# Patient Record
Sex: FEMALE | Race: WHITE | Employment: UNEMPLOYED | URBAN - NONMETROPOLITAN AREA
[De-identification: names, ages, dates, MRNs, and addresses within clinical notes are randomized per-mention and may not be internally consistent; named-entity substitution may affect disease eponyms.]

---

## 2017-01-10 ENCOUNTER — TELEPHONE (OUTPATIENT)
Dept: PRIMARY CARE CLINIC | Age: 67
End: 2017-01-10

## 2017-02-20 ENCOUNTER — OFFICE VISIT (OUTPATIENT)
Dept: CARDIOLOGY | Age: 67
End: 2017-02-20
Payer: MEDICARE

## 2017-02-20 VITALS
DIASTOLIC BLOOD PRESSURE: 60 MMHG | WEIGHT: 215 LBS | HEART RATE: 68 BPM | HEIGHT: 62 IN | BODY MASS INDEX: 39.56 KG/M2 | SYSTOLIC BLOOD PRESSURE: 138 MMHG

## 2017-02-20 DIAGNOSIS — I34.0 MILD MITRAL REGURGITATION: ICD-10-CM

## 2017-02-20 DIAGNOSIS — I25.10 ARTERIOSCLEROTIC HEART DISEASE (ASHD): ICD-10-CM

## 2017-02-20 DIAGNOSIS — I35.0 MODERATE AORTIC STENOSIS: ICD-10-CM

## 2017-02-20 DIAGNOSIS — I07.1 MILD TRICUSPID REGURGITATION: ICD-10-CM

## 2017-02-20 PROCEDURE — 99213 OFFICE O/P EST LOW 20 MIN: CPT | Performed by: INTERNAL MEDICINE

## 2017-02-20 PROCEDURE — G8598 ASA/ANTIPLAT THER USED: HCPCS | Performed by: INTERNAL MEDICINE

## 2017-02-20 PROCEDURE — G8427 DOCREV CUR MEDS BY ELIG CLIN: HCPCS | Performed by: INTERNAL MEDICINE

## 2017-02-20 PROCEDURE — G8399 PT W/DXA RESULTS DOCUMENT: HCPCS | Performed by: INTERNAL MEDICINE

## 2017-02-20 PROCEDURE — G8484 FLU IMMUNIZE NO ADMIN: HCPCS | Performed by: INTERNAL MEDICINE

## 2017-02-20 PROCEDURE — 1090F PRES/ABSN URINE INCON ASSESS: CPT | Performed by: INTERNAL MEDICINE

## 2017-02-20 PROCEDURE — 1123F ACP DISCUSS/DSCN MKR DOCD: CPT | Performed by: INTERNAL MEDICINE

## 2017-02-20 PROCEDURE — 3014F SCREEN MAMMO DOC REV: CPT | Performed by: INTERNAL MEDICINE

## 2017-02-20 PROCEDURE — G8417 CALC BMI ABV UP PARAM F/U: HCPCS | Performed by: INTERNAL MEDICINE

## 2017-02-20 PROCEDURE — 3017F COLORECTAL CA SCREEN DOC REV: CPT | Performed by: INTERNAL MEDICINE

## 2017-02-20 PROCEDURE — 4040F PNEUMOC VAC/ADMIN/RCVD: CPT | Performed by: INTERNAL MEDICINE

## 2017-02-20 PROCEDURE — 1036F TOBACCO NON-USER: CPT | Performed by: INTERNAL MEDICINE

## 2017-04-27 RX ORDER — LOVASTATIN 40 MG/1
TABLET ORAL
Qty: 30 TABLET | Refills: 0 | Status: SHIPPED | OUTPATIENT
Start: 2017-04-27 | End: 2017-05-24 | Stop reason: SDUPTHER

## 2017-05-24 ENCOUNTER — OFFICE VISIT (OUTPATIENT)
Dept: PRIMARY CARE CLINIC | Age: 67
End: 2017-05-24
Payer: MEDICARE

## 2017-05-24 VITALS
SYSTOLIC BLOOD PRESSURE: 120 MMHG | DIASTOLIC BLOOD PRESSURE: 79 MMHG | HEIGHT: 62 IN | HEART RATE: 90 BPM | BODY MASS INDEX: 38.64 KG/M2 | TEMPERATURE: 97.4 F | WEIGHT: 210 LBS | OXYGEN SATURATION: 94 % | RESPIRATION RATE: 18 BRPM

## 2017-05-24 DIAGNOSIS — D50.8 OTHER IRON DEFICIENCY ANEMIA: Primary | ICD-10-CM

## 2017-05-24 DIAGNOSIS — Z91.89 AT HIGH RISK FOR BREAST CANCER: ICD-10-CM

## 2017-05-24 DIAGNOSIS — E11.9 DIABETES MELLITUS WITHOUT COMPLICATION (HCC): ICD-10-CM

## 2017-05-24 DIAGNOSIS — I10 ESSENTIAL HYPERTENSION: ICD-10-CM

## 2017-05-24 DIAGNOSIS — E78.49 OTHER HYPERLIPIDEMIA: ICD-10-CM

## 2017-05-24 DIAGNOSIS — Z12.39 SCREENING FOR BREAST CANCER: ICD-10-CM

## 2017-05-24 PROCEDURE — 3014F SCREEN MAMMO DOC REV: CPT | Performed by: NURSE PRACTITIONER

## 2017-05-24 PROCEDURE — 3017F COLORECTAL CA SCREEN DOC REV: CPT | Performed by: NURSE PRACTITIONER

## 2017-05-24 PROCEDURE — 1036F TOBACCO NON-USER: CPT | Performed by: NURSE PRACTITIONER

## 2017-05-24 PROCEDURE — 3044F HG A1C LEVEL LT 7.0%: CPT | Performed by: NURSE PRACTITIONER

## 2017-05-24 PROCEDURE — G8427 DOCREV CUR MEDS BY ELIG CLIN: HCPCS | Performed by: NURSE PRACTITIONER

## 2017-05-24 PROCEDURE — 4040F PNEUMOC VAC/ADMIN/RCVD: CPT | Performed by: NURSE PRACTITIONER

## 2017-05-24 PROCEDURE — 99214 OFFICE O/P EST MOD 30 MIN: CPT | Performed by: NURSE PRACTITIONER

## 2017-05-24 PROCEDURE — G8598 ASA/ANTIPLAT THER USED: HCPCS | Performed by: NURSE PRACTITIONER

## 2017-05-24 PROCEDURE — 1123F ACP DISCUSS/DSCN MKR DOCD: CPT | Performed by: NURSE PRACTITIONER

## 2017-05-24 PROCEDURE — G8417 CALC BMI ABV UP PARAM F/U: HCPCS | Performed by: NURSE PRACTITIONER

## 2017-05-24 PROCEDURE — G8399 PT W/DXA RESULTS DOCUMENT: HCPCS | Performed by: NURSE PRACTITIONER

## 2017-05-24 PROCEDURE — 1090F PRES/ABSN URINE INCON ASSESS: CPT | Performed by: NURSE PRACTITIONER

## 2017-05-24 RX ORDER — LOVASTATIN 40 MG/1
40 TABLET ORAL NIGHTLY
Qty: 90 TABLET | Refills: 0 | Status: SHIPPED | OUTPATIENT
Start: 2017-05-24 | End: 2017-08-27 | Stop reason: SDUPTHER

## 2017-05-24 ASSESSMENT — ENCOUNTER SYMPTOMS
SHORTNESS OF BREATH: 0
WHEEZING: 0
APNEA: 0

## 2017-05-31 ENCOUNTER — HOSPITAL ENCOUNTER (OUTPATIENT)
Dept: WOMENS IMAGING | Age: 67
Discharge: HOME | End: 2017-05-31
Payer: MEDICARE

## 2017-05-31 DIAGNOSIS — Z91.89 AT HIGH RISK FOR BREAST CANCER: ICD-10-CM

## 2017-05-31 DIAGNOSIS — Z12.39 SCREENING FOR BREAST CANCER: ICD-10-CM

## 2017-05-31 PROCEDURE — 77063 BREAST TOMOSYNTHESIS BI: CPT

## 2017-06-08 DIAGNOSIS — E11.9 DIABETES MELLITUS WITHOUT COMPLICATION (HCC): ICD-10-CM

## 2017-06-08 DIAGNOSIS — E78.49 OTHER HYPERLIPIDEMIA: ICD-10-CM

## 2017-06-08 DIAGNOSIS — I10 ESSENTIAL HYPERTENSION: ICD-10-CM

## 2017-06-08 DIAGNOSIS — D50.8 OTHER IRON DEFICIENCY ANEMIA: ICD-10-CM

## 2017-06-08 LAB
ALBUMIN SERPL-MCNC: 4 G/DL (ref 3.5–5.2)
ALP BLD-CCNC: 78 U/L (ref 35–104)
ALT SERPL-CCNC: 18 U/L (ref 5–33)
ANION GAP SERPL CALCULATED.3IONS-SCNC: 16 MMOL/L (ref 7–19)
AST SERPL-CCNC: 19 U/L (ref 5–32)
BILIRUB SERPL-MCNC: 0.4 MG/DL (ref 0.2–1.2)
BUN BLDV-MCNC: 19 MG/DL (ref 8–23)
CALCIUM SERPL-MCNC: 9.6 MG/DL (ref 8.8–10.2)
CHLORIDE BLD-SCNC: 98 MMOL/L (ref 98–111)
CHOLESTEROL, TOTAL: 171 MG/DL (ref 160–199)
CO2: 27 MMOL/L (ref 22–29)
CREAT SERPL-MCNC: 0.7 MG/DL (ref 0.5–0.9)
GFR NON-AFRICAN AMERICAN: >60
GLUCOSE BLD-MCNC: 88 MG/DL (ref 74–109)
HBA1C MFR BLD: 5.9 %
HCT VFR BLD CALC: 37.7 % (ref 37–47)
HDLC SERPL-MCNC: 32 MG/DL (ref 65–121)
HEMOGLOBIN: 12.7 G/DL (ref 12–16)
LDL CHOLESTEROL CALCULATED: 99 MG/DL
MCH RBC QN AUTO: 28.1 PG (ref 27–31)
MCHC RBC AUTO-ENTMCNC: 33.7 G/DL (ref 33–37)
MCV RBC AUTO: 83.4 FL (ref 81–99)
PDW BLD-RTO: 12.4 % (ref 11.5–14.5)
PLATELET # BLD: 371 K/UL (ref 130–400)
PMV BLD AUTO: 8.7 FL (ref 9.4–12.3)
POTASSIUM SERPL-SCNC: 3.7 MMOL/L (ref 3.5–5)
RBC # BLD: 4.52 M/UL (ref 4.2–5.4)
SODIUM BLD-SCNC: 141 MMOL/L (ref 136–145)
TOTAL PROTEIN: 7.6 G/DL (ref 6.6–8.7)
TRIGL SERPL-MCNC: 202 MG/DL (ref 150–199)
WBC # BLD: 9.8 K/UL (ref 4.8–10.8)

## 2017-08-10 DIAGNOSIS — I10 ESSENTIAL HYPERTENSION: ICD-10-CM

## 2017-08-10 RX ORDER — METFORMIN HYDROCHLORIDE 500 MG/1
TABLET, EXTENDED RELEASE ORAL
Qty: 180 TABLET | Refills: 1 | Status: SHIPPED | OUTPATIENT
Start: 2017-08-10 | End: 2018-05-24 | Stop reason: SDUPTHER

## 2017-09-13 ENCOUNTER — OFFICE VISIT (OUTPATIENT)
Dept: PRIMARY CARE CLINIC | Age: 67
End: 2017-09-13
Payer: MEDICARE

## 2017-09-13 VITALS
TEMPERATURE: 97.3 F | HEART RATE: 84 BPM | BODY MASS INDEX: 37.47 KG/M2 | RESPIRATION RATE: 18 BRPM | SYSTOLIC BLOOD PRESSURE: 136 MMHG | DIASTOLIC BLOOD PRESSURE: 78 MMHG | WEIGHT: 203.6 LBS | OXYGEN SATURATION: 95 % | HEIGHT: 62 IN

## 2017-09-13 DIAGNOSIS — E78.5 HYPERLIPIDEMIA, UNSPECIFIED HYPERLIPIDEMIA TYPE: ICD-10-CM

## 2017-09-13 DIAGNOSIS — I10 WELL-CONTROLLED HYPERTENSION: ICD-10-CM

## 2017-09-13 DIAGNOSIS — M25.561 ACUTE PAIN OF RIGHT KNEE: ICD-10-CM

## 2017-09-13 DIAGNOSIS — Z11.59 NEED FOR HEPATITIS C SCREENING TEST: Primary | ICD-10-CM

## 2017-09-13 DIAGNOSIS — E11.9 DIABETES MELLITUS WITHOUT COMPLICATION (HCC): ICD-10-CM

## 2017-09-13 PROCEDURE — 3046F HEMOGLOBIN A1C LEVEL >9.0%: CPT | Performed by: NURSE PRACTITIONER

## 2017-09-13 PROCEDURE — 3017F COLORECTAL CA SCREEN DOC REV: CPT | Performed by: NURSE PRACTITIONER

## 2017-09-13 PROCEDURE — 1123F ACP DISCUSS/DSCN MKR DOCD: CPT | Performed by: NURSE PRACTITIONER

## 2017-09-13 PROCEDURE — G8598 ASA/ANTIPLAT THER USED: HCPCS | Performed by: NURSE PRACTITIONER

## 2017-09-13 PROCEDURE — 1036F TOBACCO NON-USER: CPT | Performed by: NURSE PRACTITIONER

## 2017-09-13 PROCEDURE — G8399 PT W/DXA RESULTS DOCUMENT: HCPCS | Performed by: NURSE PRACTITIONER

## 2017-09-13 PROCEDURE — G8417 CALC BMI ABV UP PARAM F/U: HCPCS | Performed by: NURSE PRACTITIONER

## 2017-09-13 PROCEDURE — 99214 OFFICE O/P EST MOD 30 MIN: CPT | Performed by: NURSE PRACTITIONER

## 2017-09-13 PROCEDURE — 4040F PNEUMOC VAC/ADMIN/RCVD: CPT | Performed by: NURSE PRACTITIONER

## 2017-09-13 PROCEDURE — G8427 DOCREV CUR MEDS BY ELIG CLIN: HCPCS | Performed by: NURSE PRACTITIONER

## 2017-09-13 PROCEDURE — 3014F SCREEN MAMMO DOC REV: CPT | Performed by: NURSE PRACTITIONER

## 2017-09-13 PROCEDURE — 1090F PRES/ABSN URINE INCON ASSESS: CPT | Performed by: NURSE PRACTITIONER

## 2017-09-13 RX ORDER — LISINOPRIL AND HYDROCHLOROTHIAZIDE 25; 20 MG/1; MG/1
TABLET ORAL
Qty: 90 TABLET | Refills: 5 | Status: SHIPPED | OUTPATIENT
Start: 2017-09-13 | End: 2018-10-01 | Stop reason: SDUPTHER

## 2017-09-13 RX ORDER — LANCETS 30 GAUGE
1 EACH MISCELLANEOUS DAILY
Qty: 100 EACH | Refills: 5 | Status: SHIPPED | OUTPATIENT
Start: 2017-09-13 | End: 2018-11-26 | Stop reason: SDUPTHER

## 2017-09-13 ASSESSMENT — ENCOUNTER SYMPTOMS
ABDOMINAL PAIN: 0
FACIAL SWELLING: 0
APNEA: 0
EYE PAIN: 0
COLOR CHANGE: 0
DIARRHEA: 0
SHORTNESS OF BREATH: 0
CONSTIPATION: 0
SORE THROAT: 0
COUGH: 0
EYE DISCHARGE: 0
TROUBLE SWALLOWING: 0
EYES NEGATIVE: 1
EYE REDNESS: 0
RHINORRHEA: 0
VOMITING: 0
PHOTOPHOBIA: 0
BACK PAIN: 0
SINUS PRESSURE: 0
CHEST TIGHTNESS: 0
WHEEZING: 0
EYE ITCHING: 0
VOICE CHANGE: 0
NAUSEA: 0
ABDOMINAL DISTENTION: 0

## 2017-09-20 ENCOUNTER — OFFICE VISIT (OUTPATIENT)
Dept: CARDIOLOGY | Age: 67
End: 2017-09-20
Payer: MEDICARE

## 2017-09-20 VITALS
WEIGHT: 206 LBS | DIASTOLIC BLOOD PRESSURE: 70 MMHG | BODY MASS INDEX: 37.91 KG/M2 | SYSTOLIC BLOOD PRESSURE: 118 MMHG | HEART RATE: 76 BPM | HEIGHT: 62 IN

## 2017-09-20 DIAGNOSIS — Z95.2 S/P AVR: ICD-10-CM

## 2017-09-20 DIAGNOSIS — E78.2 MIXED HYPERLIPIDEMIA: ICD-10-CM

## 2017-09-20 DIAGNOSIS — I07.1 MILD TRICUSPID REGURGITATION: ICD-10-CM

## 2017-09-20 DIAGNOSIS — I10 ESSENTIAL HYPERTENSION: ICD-10-CM

## 2017-09-20 DIAGNOSIS — I35.0 MODERATE AORTIC STENOSIS: ICD-10-CM

## 2017-09-20 DIAGNOSIS — I34.0 MILD MITRAL REGURGITATION: ICD-10-CM

## 2017-09-20 DIAGNOSIS — I25.10 CORONARY ARTERY DISEASE INVOLVING NATIVE CORONARY ARTERY OF NATIVE HEART WITHOUT ANGINA PECTORIS: Primary | ICD-10-CM

## 2017-09-20 PROCEDURE — 1123F ACP DISCUSS/DSCN MKR DOCD: CPT | Performed by: NURSE PRACTITIONER

## 2017-09-20 PROCEDURE — G8417 CALC BMI ABV UP PARAM F/U: HCPCS | Performed by: NURSE PRACTITIONER

## 2017-09-20 PROCEDURE — 3017F COLORECTAL CA SCREEN DOC REV: CPT | Performed by: NURSE PRACTITIONER

## 2017-09-20 PROCEDURE — 1090F PRES/ABSN URINE INCON ASSESS: CPT | Performed by: NURSE PRACTITIONER

## 2017-09-20 PROCEDURE — 3014F SCREEN MAMMO DOC REV: CPT | Performed by: NURSE PRACTITIONER

## 2017-09-20 PROCEDURE — G8427 DOCREV CUR MEDS BY ELIG CLIN: HCPCS | Performed by: NURSE PRACTITIONER

## 2017-09-20 PROCEDURE — G8399 PT W/DXA RESULTS DOCUMENT: HCPCS | Performed by: NURSE PRACTITIONER

## 2017-09-20 PROCEDURE — G8598 ASA/ANTIPLAT THER USED: HCPCS | Performed by: NURSE PRACTITIONER

## 2017-09-20 PROCEDURE — 99213 OFFICE O/P EST LOW 20 MIN: CPT | Performed by: NURSE PRACTITIONER

## 2017-09-20 PROCEDURE — 4040F PNEUMOC VAC/ADMIN/RCVD: CPT | Performed by: NURSE PRACTITIONER

## 2017-09-20 PROCEDURE — 1036F TOBACCO NON-USER: CPT | Performed by: NURSE PRACTITIONER

## 2017-09-20 RX ORDER — M-VIT,TX,IRON,MINS/CALC/FOLIC 27MG-0.4MG
1 TABLET ORAL DAILY
COMMUNITY

## 2017-09-27 ENCOUNTER — HOSPITAL ENCOUNTER (OUTPATIENT)
Dept: NON INVASIVE DIAGNOSTICS | Age: 67
Discharge: HOME | End: 2017-09-27
Payer: MEDICARE

## 2017-09-27 DIAGNOSIS — Z95.2 S/P AVR: ICD-10-CM

## 2017-09-27 LAB
LV EF: 60 %
LVEF MODALITY: NORMAL

## 2017-09-27 PROCEDURE — 93306 TTE W/DOPPLER COMPLETE: CPT

## 2018-01-15 ENCOUNTER — TELEPHONE (OUTPATIENT)
Dept: PRIMARY CARE CLINIC | Age: 68
End: 2018-01-15

## 2018-01-15 DIAGNOSIS — E78.5 HYPERLIPIDEMIA, UNSPECIFIED HYPERLIPIDEMIA TYPE: ICD-10-CM

## 2018-01-15 DIAGNOSIS — E11.9 DIABETES MELLITUS WITHOUT COMPLICATION (HCC): ICD-10-CM

## 2018-01-15 DIAGNOSIS — Z11.59 NEED FOR HEPATITIS C SCREENING TEST: ICD-10-CM

## 2018-01-15 LAB
ALBUMIN SERPL-MCNC: 4.5 G/DL (ref 3.5–5.2)
ALP BLD-CCNC: 89 U/L (ref 35–104)
ALT SERPL-CCNC: 21 U/L (ref 5–33)
ANION GAP SERPL CALCULATED.3IONS-SCNC: 13 MMOL/L (ref 7–19)
AST SERPL-CCNC: 27 U/L (ref 5–32)
BILIRUB SERPL-MCNC: 0.5 MG/DL (ref 0.2–1.2)
BUN BLDV-MCNC: 15 MG/DL (ref 8–23)
CALCIUM SERPL-MCNC: 9.9 MG/DL (ref 8.8–10.2)
CHLORIDE BLD-SCNC: 99 MMOL/L (ref 98–111)
CHOLESTEROL, TOTAL: 205 MG/DL (ref 160–199)
CO2: 30 MMOL/L (ref 22–29)
CREAT SERPL-MCNC: 0.8 MG/DL (ref 0.5–0.9)
GFR NON-AFRICAN AMERICAN: >60
GLUCOSE BLD-MCNC: 84 MG/DL (ref 74–109)
HBA1C MFR BLD: 5.7 %
HCT VFR BLD CALC: 38.9 % (ref 37–47)
HDLC SERPL-MCNC: 39 MG/DL (ref 65–121)
HEMOGLOBIN: 13 G/DL (ref 12–16)
HEPATITIS C ANTIBODY INTERPRETATION: NORMAL
LDL CHOLESTEROL CALCULATED: 116 MG/DL
MCH RBC QN AUTO: 28.4 PG (ref 27–31)
MCHC RBC AUTO-ENTMCNC: 33.4 G/DL (ref 33–37)
MCV RBC AUTO: 84.9 FL (ref 81–99)
PDW BLD-RTO: 13.7 % (ref 11.5–14.5)
PLATELET # BLD: 306 K/UL (ref 130–400)
PMV BLD AUTO: 9.4 FL (ref 9.4–12.3)
POTASSIUM SERPL-SCNC: 3.7 MMOL/L (ref 3.5–5)
RBC # BLD: 4.58 M/UL (ref 4.2–5.4)
SODIUM BLD-SCNC: 142 MMOL/L (ref 136–145)
TOTAL PROTEIN: 7.8 G/DL (ref 6.6–8.7)
TRIGL SERPL-MCNC: 250 MG/DL (ref 0–149)
WBC # BLD: 7.2 K/UL (ref 4.8–10.8)

## 2018-01-15 NOTE — TELEPHONE ENCOUNTER
----- Message from LULA Orellana sent at 1/15/2018 12:12 PM CST -----    Please notify patient of abnormal results. Total cholesterol, triglycerides are slightly elevated, HDL is 39 which is lower than we would like to see it. A1c is 5.7 which is good. I would recommend switching from lovastatin to atorvastatin if the patient is willing it would likely be more effective given her abnormalities. She does have an upcoming appointment scheduled later this month. Lipid Panel   Order: [id number]   Status:  Final result   Visible to patient:  No (Not Released) Dx:  Hyperlipidemia, unspecified hyperlipi. .. Notes Recorded by LULA Orellana on 1/15/2018 at 12:12 PM CST  Please notify patient of abnormal results. Total cholesterol, triglycerides are slightly elevated, HDL is 39 which is lower than we would like to see it. A1c is 5.7 which is good. I would recommend switching from lovastatin to atorvastatin if the patient is willing it would likely be more effective given her abnormalities. She does have an upcoming appointment scheduled later this month.      Ref Range & Units 09:59  (1/15/18) 7mo ago  (6/8/17) 1yr ago  (12/30/16) 2yr ago  (8/10/15) 2yr ago  (3/18/15) 3yr ago  (12/11/14)    Cholesterol, Total 160 - 199 mg/dL 205   171CM  200CM   199R, CM  208R, CM   199R, CM    Comments: <160 MG/DL=OPTIMAL     160-199 MG/DL= DESIRABLE     200-239 MG/DL=BORDERLINE-INCREASED RISK OF ATHEROSCLEROTIC   CARDIOVASCULAR DISEASE     > OR = 240 MG/DL-ASSOCIATED WITH AN INCREASED RISK OF   ATHROSCLEROTIC CARDIOVASCULAR DISEASE     Triglycerides 0 - 149 mg/dL 250   202R   241R   408R, CM   346R   328R      HDL 65 - 121 mg/dL 39   32CM   37CM   29R, CM   31R, CM   29R, CM    Comments: VALUES>60 MG/DL ARE ASSOCIATED WITH A DECREASED RISK OF   ATHEROSCLEROTIC CARDIOVASCULAR DISEASE     LDL Calculated <100 mg/dL 116  <100 mg/dL\" class=\"[id number]\">  99CM  <100 mg/dL\" class=\"[id number]\">  115CM       Comments: <100 would recommend switching from lovastatin to atorvastatin if the patient is willing it would likely be more effective given her abnormalities. She does have an upcoming appointment scheduled later this month. Ref Range & Units 09:59  (1/15/18) 7mo ago  (6/8/17) 1yr ago  (12/30/16) 1yr ago  (8/1/16)    Hemoglobin A1C See comment % 5.7  5.9CM  5.8CM  6. 2CM     Comments: HbA1c levels >6% are an indication of hyperglycemia during the preceding 2   to 3 months or longer. HbA1c levels may reach 20% or higher in poorly controlled diabetes. Therapeutic action is suggested at levels above 8%. Diabetes patients with HbA1c levels below 7% meet the goal of the American   Diabetes Association. HbA1c levels below the established reference range may indicate recent   episodes of hypoglycemia, the presence of Hb variants, or shortened lifetime   of erythrocytes.     [facility]  [address] [facility]      Specimen Collected: 01/15/18 09:59 Last Resulted: 01/15/18 11:32              CM=Additional comments              CBC   Order: [id number]   Status:  Final result   Visible to patient:  No (Not Released) Dx:  Diabetes mellitus without complicatio. .. Notes Recorded by LULA Ortega on 1/15/2018 at 12:12 PM CST  Please notify patient of abnormal results. Total cholesterol, triglycerides are slightly elevated, HDL is 39 which is lower than we would like to see it. A1c is 5.7 which is good. I would recommend switching from lovastatin to atorvastatin if the patient is willing it would likely be more effective given her abnormalities. She does have an upcoming appointment scheduled later this month.      Ref Range & Units 09:59  (1/15/18) 7mo ago  (6/8/17) 1yr ago  (12/30/16) 1yr ago  (4/6/16) 1yr ago  (4/5/16) 1yr ago  (4/4/16) 1yr ago  (4/3/16)    WBC 4.8 - 10.8 K/uL 7.2  9.8  6.9  14.0   13.8   13.5   17.1      RBC 4.20 -

## 2018-01-23 ENCOUNTER — TELEPHONE (OUTPATIENT)
Dept: PRIMARY CARE CLINIC | Age: 68
End: 2018-01-23

## 2018-01-24 ENCOUNTER — OFFICE VISIT (OUTPATIENT)
Dept: PRIMARY CARE CLINIC | Age: 68
End: 2018-01-24
Payer: MEDICARE

## 2018-01-24 VITALS
OXYGEN SATURATION: 98 % | DIASTOLIC BLOOD PRESSURE: 87 MMHG | HEART RATE: 81 BPM | TEMPERATURE: 97.6 F | BODY MASS INDEX: 36.62 KG/M2 | SYSTOLIC BLOOD PRESSURE: 138 MMHG | RESPIRATION RATE: 18 BRPM | HEIGHT: 62 IN | WEIGHT: 199 LBS

## 2018-01-24 DIAGNOSIS — E66.9 OBESITY (BMI 35.0-39.9 WITHOUT COMORBIDITY): Chronic | ICD-10-CM

## 2018-01-24 DIAGNOSIS — I10 ESSENTIAL HYPERTENSION: Chronic | ICD-10-CM

## 2018-01-24 DIAGNOSIS — E11.9 TYPE 2 DIABETES MELLITUS WITHOUT COMPLICATION, WITHOUT LONG-TERM CURRENT USE OF INSULIN (HCC): Chronic | ICD-10-CM

## 2018-01-24 DIAGNOSIS — Z23 NEEDS FLU SHOT: ICD-10-CM

## 2018-01-24 DIAGNOSIS — Z23 NEED FOR PROPHYLACTIC VACCINATION AGAINST DIPHTHERIA-TETANUS-PERTUSSIS (DTP): ICD-10-CM

## 2018-01-24 DIAGNOSIS — E78.2 MIXED HYPERLIPIDEMIA: Chronic | ICD-10-CM

## 2018-01-24 PROCEDURE — 3017F COLORECTAL CA SCREEN DOC REV: CPT | Performed by: NURSE PRACTITIONER

## 2018-01-24 PROCEDURE — G8399 PT W/DXA RESULTS DOCUMENT: HCPCS | Performed by: NURSE PRACTITIONER

## 2018-01-24 PROCEDURE — 4040F PNEUMOC VAC/ADMIN/RCVD: CPT | Performed by: NURSE PRACTITIONER

## 2018-01-24 PROCEDURE — 1090F PRES/ABSN URINE INCON ASSESS: CPT | Performed by: NURSE PRACTITIONER

## 2018-01-24 PROCEDURE — 1123F ACP DISCUSS/DSCN MKR DOCD: CPT | Performed by: NURSE PRACTITIONER

## 2018-01-24 PROCEDURE — G0008 ADMIN INFLUENZA VIRUS VAC: HCPCS | Performed by: NURSE PRACTITIONER

## 2018-01-24 PROCEDURE — 3014F SCREEN MAMMO DOC REV: CPT | Performed by: NURSE PRACTITIONER

## 2018-01-24 PROCEDURE — G8484 FLU IMMUNIZE NO ADMIN: HCPCS | Performed by: NURSE PRACTITIONER

## 2018-01-24 PROCEDURE — 1036F TOBACCO NON-USER: CPT | Performed by: NURSE PRACTITIONER

## 2018-01-24 PROCEDURE — 99214 OFFICE O/P EST MOD 30 MIN: CPT | Performed by: NURSE PRACTITIONER

## 2018-01-24 PROCEDURE — G8417 CALC BMI ABV UP PARAM F/U: HCPCS | Performed by: NURSE PRACTITIONER

## 2018-01-24 PROCEDURE — G8427 DOCREV CUR MEDS BY ELIG CLIN: HCPCS | Performed by: NURSE PRACTITIONER

## 2018-01-24 PROCEDURE — 3044F HG A1C LEVEL LT 7.0%: CPT | Performed by: NURSE PRACTITIONER

## 2018-01-24 PROCEDURE — 90662 IIV NO PRSV INCREASED AG IM: CPT | Performed by: NURSE PRACTITIONER

## 2018-01-24 PROCEDURE — G8598 ASA/ANTIPLAT THER USED: HCPCS | Performed by: NURSE PRACTITIONER

## 2018-01-24 ASSESSMENT — ENCOUNTER SYMPTOMS
GASTROINTESTINAL NEGATIVE: 1
RESPIRATORY NEGATIVE: 1
EYES NEGATIVE: 1
ALLERGIC/IMMUNOLOGIC NEGATIVE: 1

## 2018-01-24 NOTE — PROGRESS NOTES
[facility]  [address]  [address] [facility] [address]  Dept: [phone]  Dept Fax: [phone]  Loc: [phone]    Arianna Reis is a 75 y.o. female who presents today for her medical conditions/complaints as noted below. Arianna Reis is c/o of   Chief Complaint   Patient presents with    3 Month Follow-Up    Diabetes    Hypertension    Hyperlipidemia    Other     Obesity with body mass index of 36.40, due for tetanus and influenza symptoms. HPI   This is a pleasant cooperative 60-year-old female who presents in office follow-up on chronic medical conditions. She has a history of type II diabetes which is well controlled, hypertension which is well controlled but is still a little higher than I would like to see it. Cholesterol levels are improving and the patient is actively losing weight and is putting strong emphasis on diet and exercise. She is due for her tetanus and influenza immunizations and is receptive to having that was administered here today. Patient is followed by [address] [place] [address] cardiology she has a history of aortic valve replacement, currently denies chest pain or shortness of breath. Body mass index is 36.4 kg/m². Wt Readings from Last 3 Encounters:   01/24/18 199 lb (90.3 kg)   09/20/17 206 lb (93.4 kg)   09/13/17 203 lb 9.6 oz (92.4 kg)     BP Readings from Last 3 Encounters:   01/24/18 138/87   09/20/17 118/70   09/13/17 136/78      Treatment Adherence:   Medication compliance:  compliant all of the time  Diet compliance:  compliant all of the time  Weight trend: decreasing  Current exercise: walks 5 time(s) per week  Barriers: none    Diabetes Mellitus Type 2: Current symptoms/problems include none. Home blood sugar records: fasting range: 90  Any episodes of hypoglycemia? no  Eye exam current (within one year): yes  Tobacco history: She  reports that she has never smoked.  She has never used smokeless tobacco.   Daily Aspirin? Yes    Hypertension:  Home blood pressure monitoring: Yes -Systolic range has been in the 953I and diastolic in the 27D . She is adherent to a low sodium diet. Patient denies chest pain, shortness of breath, headache, lightheadedness, blurred vision, peripheral edema, palpitations, dry cough and fatigue. Antihypertensive medication side effects: no medication side effects noted. Use of agents associated with hypertension: none. Hyperlipidemia:  No new myalgias or GI upset on lovastatin (Mevacor).        Lab Results   Component Value Date    LABA1C 5.7 01/15/2018    LABA1C 5.9 2017    LABA1C 5.8 2016     Lab Results   Component Value Date    LABMICR <1.20 2016    CREATININE 0.8 01/15/2018     Lab Results   Component Value Date    ALT 21 01/15/2018    AST 27 01/15/2018     Lab Results   Component Value Date    CHOL 205 (H) 01/15/2018    TRIG 250 (H) 01/15/2018    HDL 39 (L) 01/15/2018    LDLCALC 116 01/15/2018    LDLDIRECT 111 08/10/2015        Hemoglobin A1C (%)   Date Value   01/15/2018 5.7   2017 5.9   2016 5.8             ( goal A1C is < 7)   Microalbumin, Random Urine (mg/dL)   Date Value   2016 <1.20     LDL Calculated (mg/dL)   Date Value   01/15/2018 116   2017 99   2016 115       (goal LDL is <100)   AST (U/L)   Date Value   01/15/2018 27     ALT (U/L)   Date Value   01/15/2018 21     BUN (mg/dL)   Date Value   01/15/2018 15     BP Readings from Last 3 Encounters:   18 138/87   17 118/70   17 136/78          (goal 120/80)    Past Medical History:   Diagnosis Date    Arteriosclerotic heart disease (ASHD)     Coronary artery disease involving native coronary artery of native heart without angina pectoris     Family history of heart disease     father  37 MI, brother 46  MI, brother 61  MI, sister 58  MI    Hyperlipidemia     Hypertension     Mild mitral regurgitation 2016    Mild Abdominal: Soft. Bowel sounds are normal.   Musculoskeletal: Normal range of motion. Neurological: She is alert and oriented to person, place, and time. Skin: Skin is warm and dry. Psychiatric: She has a normal mood and affect. Her behavior is normal. Judgment and thought content normal.   Nursing note and vitals reviewed. /87   Pulse 81   Temp 97.6 °F (36.4 °C) (Temporal)   Resp 18   Ht 5' 2\" (1.575 m)   Wt 199 lb (90.3 kg)   SpO2 98%   BMI 36.40 kg/m²     Assessment:      1. Type 2 diabetes mellitus without complication, without long-term current use of insulin ([address]     2. Essential hypertension     3. Mixed hyperlipidemia     4. Obesity (BMI 35.0-39.9 without comorbidity)     5. Needs flu shot  Influenza, High Dose, 65 yrs  and older, IM, PF, Prefill Syr, 0.5mL (FLUZONE HD)   6. Need for prophylactic vaccination against diphtheria-tetanus-pertussis (DTP)  Tdap (age 10y-63y) IM (ADACEL)    DISCONTINUED: Tdap (ADACEL) 5-2-15.5 LF-MCG/0.5 injection             Plan:         Andrew Whitaker was seen today for 3 month follow-up, diabetes, hypertension, hyperlipidemia and other. Diagnoses and all orders for this visit:    Type 2 diabetes mellitus without complication, without long-term current use of insulin ([facility])  Comments:  A1c is 5.7, fasting glucose running in the 90s, patient is exercising and losing weight, she is doing well on her diabetes management    Essential hypertension  Comments:  Patient's blood pressure is ranging in the 124U systolic and 56M diastolic. We discussed interventions to improve, would like to have 120/80 range . Mixed hyperlipidemia  Comments:  Patient's total cholesterol and LDL are still out of range on 40 of Mevacor. We will change her to Lipitor 40 mg which I believe will improve these numbers.     Obesity (BMI 35.0-39.9 without comorbidity)  Comments:  Patient has gradually been losing weight and I'm encouraging her to continue this progress she is no longer in the morbid obesity range. Needs flu shot  -     Influenza, High Dose, 65 yrs  and older, IM, PF, Prefill Syr, 0.5mL (FLUZONE HD)    Need for prophylactic vaccination against diphtheria-tetanus-pertussis (DTP)  -     Discontinue: Tdap (ADACEL) 5-2-15.5 LF-MCG/0.5 injection; Inject 0.5 mLs into the muscle once for 1 dose  -     Tdap (age 10y-63y) IM (ADACEL)    Other orders  -     atorvastatin (LIPITOR) 40 MG tablet; Take 1 tablet by mouth daily      Continue Current Medications, other than changing from Mevacor to Lipitor  Call with any problems or concerns  Dietary Education given  Report any sudden change in weight or soa      Return in about 3 months (around 4/24/2018), or if symptoms worsen or fail to improve. Orders Placed This Encounter   Procedures    Influenza, High Dose, 65 yrs  and older, IM, PF, Prefill Syr, 0.5mL (FLUZONE HD)    Tdap (age 10y-63y) IM (ADACEL)     Orders Placed This Encounter   Medications    DISCONTD: Tdap (ADACEL) 5-2-15.5 LF-MCG/0.5 injection     Sig: Inject 0.5 mLs into the muscle once for 1 dose     Dispense:  0.5 mL     Refill:  0       Patient given educational materials - see patient instructions. Discussed use, benefit, and side effects of prescribed medications. All patient questions answered. Pt voiced understanding. Reviewed health maintenance. Instructed to continue current medications, diet and exercise. Patient agreed with treatment plan. Follow up as directed. Heart-Healthy Diet   Sodium, Fat, and Cholesterol Controlled Diet       What Is a Heart Healthy Diet? A heart-healthy diet is one that limits sodium , certain types of fat , and cholesterol .  This type of diet is recommended for:   · People with any form of cardiovascular disease (eg, coronary heart disease , peripheral vascular disease , previous heart attack , previous stroke )   · People with risk factors for cardiovascular disease, such as high blood pressure , high cholesterol , or diabetes

## 2018-01-24 NOTE — PROGRESS NOTES
Subjective:      Patient ID: Sammie Quigley is a 75 y.o. female. HPI    Review of Systems   Constitutional: Negative. HENT: Negative. Eyes: Negative. Respiratory: Negative. Cardiovascular: Negative. Gastrointestinal: Negative. Endocrine: Negative. Genitourinary: Negative. Musculoskeletal: Negative. Skin: Negative. Allergic/Immunologic: Negative. Neurological: Negative. Hematological: Negative. Psychiatric/Behavioral: Negative. Objective:   Physical Exam   Constitutional: She appears well-developed and well-nourished. Eyes: Pupils are equal, round, and reactive to light. Cardiovascular: Normal rate, regular rhythm and normal heart sounds. Slight murmur noted. Pt has a history of valve replacement. Pulmonary/Chest: Effort normal and breath sounds normal.   Skin: Skin is warm and dry. Psychiatric: She has a normal mood and affect.        Assessment:      ***      Plan:      ***

## 2018-01-26 ENCOUNTER — CARE COORDINATOR VISIT (OUTPATIENT)
Dept: CARE COORDINATION | Age: 68
End: 2018-01-26

## 2018-01-27 RX ORDER — ATORVASTATIN CALCIUM 40 MG/1
40 TABLET, FILM COATED ORAL DAILY
Qty: 30 TABLET | Refills: 3 | Status: SHIPPED | OUTPATIENT
Start: 2018-01-27 | End: 2018-05-24 | Stop reason: SDUPTHER

## 2018-01-27 ASSESSMENT — ENCOUNTER SYMPTOMS
APNEA: 0
SHORTNESS OF BREATH: 0
WHEEZING: 0

## 2018-02-19 ENCOUNTER — OFFICE VISIT (OUTPATIENT)
Dept: CARDIOLOGY | Age: 68
End: 2018-02-19
Payer: MEDICARE

## 2018-02-19 VITALS
HEART RATE: 80 BPM | BODY MASS INDEX: 36.62 KG/M2 | HEIGHT: 62 IN | DIASTOLIC BLOOD PRESSURE: 70 MMHG | WEIGHT: 199 LBS | SYSTOLIC BLOOD PRESSURE: 138 MMHG

## 2018-02-19 DIAGNOSIS — Z95.2 HX OF REPLACEMENT OF AORTIC VALVE: ICD-10-CM

## 2018-02-19 DIAGNOSIS — I38 VALVULAR HEART DISEASE: Primary | ICD-10-CM

## 2018-02-19 PROCEDURE — G8399 PT W/DXA RESULTS DOCUMENT: HCPCS | Performed by: INTERNAL MEDICINE

## 2018-02-19 PROCEDURE — 1036F TOBACCO NON-USER: CPT | Performed by: INTERNAL MEDICINE

## 2018-02-19 PROCEDURE — 3014F SCREEN MAMMO DOC REV: CPT | Performed by: INTERNAL MEDICINE

## 2018-02-19 PROCEDURE — 3017F COLORECTAL CA SCREEN DOC REV: CPT | Performed by: INTERNAL MEDICINE

## 2018-02-19 PROCEDURE — 1090F PRES/ABSN URINE INCON ASSESS: CPT | Performed by: INTERNAL MEDICINE

## 2018-02-19 PROCEDURE — 1123F ACP DISCUSS/DSCN MKR DOCD: CPT | Performed by: INTERNAL MEDICINE

## 2018-02-19 PROCEDURE — 4040F PNEUMOC VAC/ADMIN/RCVD: CPT | Performed by: INTERNAL MEDICINE

## 2018-02-19 PROCEDURE — G8598 ASA/ANTIPLAT THER USED: HCPCS | Performed by: INTERNAL MEDICINE

## 2018-02-19 PROCEDURE — G8484 FLU IMMUNIZE NO ADMIN: HCPCS | Performed by: INTERNAL MEDICINE

## 2018-02-19 PROCEDURE — G8427 DOCREV CUR MEDS BY ELIG CLIN: HCPCS | Performed by: INTERNAL MEDICINE

## 2018-02-19 PROCEDURE — G8417 CALC BMI ABV UP PARAM F/U: HCPCS | Performed by: INTERNAL MEDICINE

## 2018-02-19 PROCEDURE — 99213 OFFICE O/P EST LOW 20 MIN: CPT | Performed by: INTERNAL MEDICINE

## 2018-02-22 NOTE — PROGRESS NOTES
auscultation bilaterally with normal respiratory effort. CARDIOVASCULAR:   The heart's rhythm is regular with grade 2/6 systolic ejection murmur of the left sternal border. ABDOMEN:  Soft, no tenderness or mass. UPPER EXTREMITY EVALUATION:  Radial pulses palpable bilaterally. LOWER EXTREMITY EVALUATION:  Negative for peripheral edema. Femoral, popliteal, dorsalis pedis, and posterior tibialis pulses 2+ to palpation bilaterally. No cyanosis or clubbing. MUSCULOSKELETAL:  Normal muscle strength and tone. No atrophy or abnormalities. SKIN:  Warm, dry, intact. No dermatitis or ulcers. NEUROLOGIC:  Intact cranial nerves II through XII and no focal weakness. Assessment / Plan:   1.  Valvular heart disease - this appears stable on current therapy. 2.  Continue current medication regimen. 3.  Return in six months.                 _____________________________________________________  Electronically Signed by:   JENNIFER Chauhan M.D., F.A.C.C.    [facility]  _____________________________________________________  Copy:  LULA Novak

## 2018-05-24 ENCOUNTER — OFFICE VISIT (OUTPATIENT)
Dept: PRIMARY CARE CLINIC | Age: 68
End: 2018-05-24
Payer: MEDICARE

## 2018-05-24 VITALS
OXYGEN SATURATION: 98 % | DIASTOLIC BLOOD PRESSURE: 62 MMHG | SYSTOLIC BLOOD PRESSURE: 128 MMHG | HEART RATE: 66 BPM | WEIGHT: 195 LBS | HEIGHT: 62 IN | BODY MASS INDEX: 35.88 KG/M2 | TEMPERATURE: 97.8 F

## 2018-05-24 DIAGNOSIS — E78.5 HYPERLIPIDEMIA, UNSPECIFIED HYPERLIPIDEMIA TYPE: ICD-10-CM

## 2018-05-24 DIAGNOSIS — E11.9 TYPE 2 DIABETES MELLITUS WITHOUT COMPLICATION, WITHOUT LONG-TERM CURRENT USE OF INSULIN (HCC): Primary | ICD-10-CM

## 2018-05-24 DIAGNOSIS — Z23 NEED FOR PROPHYLACTIC VACCINATION AND INOCULATION AGAINST VARICELLA: ICD-10-CM

## 2018-05-24 DIAGNOSIS — Z23 NEED FOR PROPHYLACTIC VACCINATION AGAINST STREPTOCOCCUS PNEUMONIAE (PNEUMOCOCCUS): ICD-10-CM

## 2018-05-24 DIAGNOSIS — I10 ESSENTIAL HYPERTENSION: ICD-10-CM

## 2018-05-24 PROCEDURE — 99214 OFFICE O/P EST MOD 30 MIN: CPT | Performed by: NURSE PRACTITIONER

## 2018-05-24 RX ORDER — METFORMIN HYDROCHLORIDE 500 MG/1
500 TABLET, EXTENDED RELEASE ORAL 2 TIMES DAILY
Qty: 180 TABLET | Refills: 0 | Status: SHIPPED | OUTPATIENT
Start: 2018-05-24 | End: 2018-08-22 | Stop reason: SDUPTHER

## 2018-05-24 RX ORDER — ATORVASTATIN CALCIUM 40 MG/1
40 TABLET, FILM COATED ORAL DAILY
Qty: 90 TABLET | Refills: 0 | Status: SHIPPED | OUTPATIENT
Start: 2018-05-24 | End: 2018-08-28 | Stop reason: SDUPTHER

## 2018-05-24 ASSESSMENT — PATIENT HEALTH QUESTIONNAIRE - PHQ9
SUM OF ALL RESPONSES TO PHQ QUESTIONS 1-9: 1
1. LITTLE INTEREST OR PLEASURE IN DOING THINGS: 1
2. FEELING DOWN, DEPRESSED OR HOPELESS: 0
SUM OF ALL RESPONSES TO PHQ9 QUESTIONS 1 & 2: 1

## 2018-05-24 ASSESSMENT — ENCOUNTER SYMPTOMS
WHEEZING: 0
EYE PAIN: 0
BACK PAIN: 0
SINUS PAIN: 0
VOMITING: 0
NAUSEA: 0
COUGH: 0
DIARRHEA: 0
SINUS PRESSURE: 0
ABDOMINAL PAIN: 0
SHORTNESS OF BREATH: 0

## 2018-08-20 ENCOUNTER — OFFICE VISIT (OUTPATIENT)
Dept: CARDIOLOGY | Age: 68
End: 2018-08-20
Payer: MEDICARE

## 2018-08-20 VITALS
WEIGHT: 190 LBS | BODY MASS INDEX: 34.96 KG/M2 | SYSTOLIC BLOOD PRESSURE: 128 MMHG | DIASTOLIC BLOOD PRESSURE: 80 MMHG | HEIGHT: 62 IN

## 2018-08-20 DIAGNOSIS — I25.10 CORONARY ARTERY DISEASE INVOLVING NATIVE CORONARY ARTERY OF NATIVE HEART WITHOUT ANGINA PECTORIS: Primary | ICD-10-CM

## 2018-08-20 DIAGNOSIS — E78.2 MIXED HYPERLIPIDEMIA: ICD-10-CM

## 2018-08-20 DIAGNOSIS — Z95.2 S/P AVR: ICD-10-CM

## 2018-08-20 DIAGNOSIS — I10 ESSENTIAL HYPERTENSION: ICD-10-CM

## 2018-08-20 DIAGNOSIS — I97.89 POSTOPERATIVE ATRIAL FIBRILLATION (HCC): ICD-10-CM

## 2018-08-20 DIAGNOSIS — I48.91 POSTOPERATIVE ATRIAL FIBRILLATION (HCC): ICD-10-CM

## 2018-08-20 PROCEDURE — 3017F COLORECTAL CA SCREEN DOC REV: CPT | Performed by: NURSE PRACTITIONER

## 2018-08-20 PROCEDURE — G8427 DOCREV CUR MEDS BY ELIG CLIN: HCPCS | Performed by: NURSE PRACTITIONER

## 2018-08-20 PROCEDURE — 99214 OFFICE O/P EST MOD 30 MIN: CPT | Performed by: NURSE PRACTITIONER

## 2018-08-20 PROCEDURE — 1090F PRES/ABSN URINE INCON ASSESS: CPT | Performed by: NURSE PRACTITIONER

## 2018-08-20 PROCEDURE — G8417 CALC BMI ABV UP PARAM F/U: HCPCS | Performed by: NURSE PRACTITIONER

## 2018-08-20 PROCEDURE — 1101F PT FALLS ASSESS-DOCD LE1/YR: CPT | Performed by: NURSE PRACTITIONER

## 2018-08-20 PROCEDURE — 93000 ELECTROCARDIOGRAM COMPLETE: CPT | Performed by: NURSE PRACTITIONER

## 2018-08-20 NOTE — PATIENT INSTRUCTIONS
problems. It's also a good idea to know your test results and keep a list of the medicines you take. How can you care for yourself at home? Diet    · Use less salt when you cook and eat. This helps lower your blood pressure. Taste food before salting. Add only a little salt when you think you need it. With time, your taste buds will adjust to less salt.     · Eat fewer snack items, fast foods, canned soups, and other high-salt, high-fat, processed foods.     · Read food labels and try to avoid saturated and trans fats. They increase your risk of heart disease by raising cholesterol levels.     · Limit the amount of solid fat-butter, margarine, and shortening-you eat. Use olive, peanut, or canola oil when you cook. Bake, broil, and steam foods instead of frying them.     · Eating fish can lower your risk for heart disease. Eat at least 2 servings of fish a week. [place], mackerel, herring, sardines, and chunk light tuna are very good choices. These fish contain omega-3 fatty acids.     · Eat a variety of fruit and vegetables every day. Dark green, deep orange, red, or yellow fruits and vegetables are especially good for you. Examples include spinach, carrots, peaches, and berries.     · Foods high in fiber can reduce your cholesterol and provide important vitamins and minerals. High-fiber foods include whole-grain cereals and breads, oatmeal, beans, brown rice, citrus fruits, and apples.     · Limit drinks and foods with added sugar. These include candy, desserts, and soda pop.    Lifestyle changes    · If your doctor recommends it, get more exercise. Walking is a good choice. Bit by bit, increase the amount you walk every day. Try for at least 30 minutes on most days of the week. You also may want to swim, bike, or do other activities.     · Do not smoke. If you need help quitting, talk to your doctor about stop-smoking programs and medicines. These can increase your chances of quitting for good.  Quitting smoking

## 2018-08-20 NOTE — PROGRESS NOTES
[facility], [place]. [address] [facility]  [phone] office  [phone] fax      OFFICE VISIT:  2018    Any Dupont - : 1950    Reason For Visit:  Robert Vela is a 70 y.o. female who is here for 6 Month Follow-Up (Patient presents for cardiology followup doing well.); Coronary Artery Disease; Cardiac Valve Problem (s/p pericardial AVR); Hypertension; and Hyperlipidemia    The patient presents today for cardiology follow up. Overall, the patient is doing well from a cardiac standpoint without symptoms to suggest myocardial ischemia. BP is well controlled on current regimen. The patient's PCP monitors cholesterol. Subjective  Robert Vela denies exertional chest pain, shortness of breath, orthopnea, paroxysmal nocturnal dyspnea, syncope, presyncope, sustained arrythmia, edema and fatigue. The patient denies numbness or weakness to suggest cerebrovascular accident or transient ischemic attack.       Any Dupont has the following history as recorded in [facility]:    Patient Active Problem List   Diagnosis Code    Hyperlipidemia E78.5    Family history of heart disease Z82.49    Murmur R01.1    Family history of heart disease Z82.49    Chest pain R07.9    SIGALA (dyspnea on exertion) R06.09    Type 2 diabetes mellitus without complication (HCC) P88.6    S/P AVR Z95.2    Coronary artery disease involving native coronary artery of native heart without angina pectoris I25.10    Essential hypertension I10    Postoperative atrial fibrillation (HCC) I97.89, I48.91    Palpitations R00.2    Moderate aortic stenosis I35.0    Mild mitral regurgitation I34.0    Mild tricuspid regurgitation I07.1    Arteriosclerotic heart disease (ASHD) I25.10     Past Medical History:   Diagnosis Date    Arteriosclerotic heart disease (ASHD)     Coronary artery disease involving native coronary artery of native heart without VI;ONE TOUCH ULTRA TEST VI) strip 1 each by In Vitro route daily As needed. Dx.e11.9 100 each 5    lisinopril-hydrochlorothiazide (PRINZIDE;ZESTORETIC) 20-25 MG per tablet TAKE ONE TABLET BY MOUTH ONCE DAILY 90 tablet 5    aspirin 81 MG tablet Take 81 mg by mouth nightly        No current facility-administered medications for this visit. Allergies: Corticosteroids and Niacin and related    Review of Systems  Constitutional  no appetite change, or unexpected weight change. No fever, chills or diaphoresis. No significant change in activity level or new onset of fatigue. HEENT  no significant rhinorrhea or epistaxis. No tinnitus or significant hearing loss. Eyes  no sudden vision change or amaurosis. No corneal arcus, xantholasma, subconjunctival hemorrhage or discharge. Respiratory  no significant wheezing, stridor, apnea or cough. No dyspnea on exertion or shortness of air. Cardiovascular  no exertional chest pain to suggest myocardial ischemia. No orthopnea or PND. No sensation of sustained arrythmia. No occurrence of slow heart rate. No palpitations. No claudication. No leg edema. Gastrointestinal  no abdominal swelling or pain. No blood in stool. No severe constipation, diarrhea, nausea, or vomiting. Genitourinary  no dysuria, frequency, or urgency. No flank pain or hematuria. Musculoskeletal  no back pain or myalgia. No problems with gait. Extremities - no clubbing, cyanosis or edema. Skin  no color change or rash. No pallor. No new surgical incision. Neurologic  no speech difficulty, facial asymmetry or lateralizing weakness. No seizures, presyncope or syncope. No significant dizziness. Hematologic  no easy bruising or excessive bleeding. Psychiatric  no severe anxiety or insomnia. No confusion. All other review of systems are negative.     Objective  Vital Signs - /80   Ht 5' 2\" (1.575 m)   Wt 190 lb (86.2 kg)   BMI 34.75 kg/m²    - Gino Brice is diastolic dysfunction.    Signature    ----------------------------------------------------------------   Electronically signed by Katy Adames MD(Interpreting   [email or web address]) on 09/27/2017 05:07 PM   ----------------------------------------------------------------    Findings    Mitral Valve   Mitral valve leaflets are mildly thickened with preserved leaflet   mobility.   Mild mitral regurgitation is present.      Aortic Valve   Normal functioning bioprosthetic valve in the aortic position.   No evidence of aortic valve regurgitation .      Tricuspid Valve   Tricuspid valve is structurally normal.   Mild tricuspid regurgitation.      Pulmonic Valve   The pulmonic valve was not well visualized .   Mild pulmonic regurgitation present.      Left Atrium   Normal size left atrium.   No evidence of thrombus within left atrium.      Left Ventricle   Left ventricular size is normal .   Normal left ventricular wall thickness.   Left ventricular ejection fraction is visually estimated at 60%.  E/A flow reversal noted. Suggestive of diastolic dysfunction.      Right Atrium   Normal right atrial dimension with no evidence of thrombus or mass noted.      Right Ventricle   Normal right ventricular size with preserved RV function.   Right ventricular systolic pressure is noted at 31mmHg which suggests mild   pulmonary hypertension.      Pericardial Effusion   No evidence of significant pericardial effusion is noted.      Pleural Effusion   No evidence of pleural effusion.      Miscellaneous   Aortic root dimension within normal limits.   The IVC is normal .    EKG reviewed:  NSR 70 BPM; no acute ischemic changes or ectopy. Stable CV status without symptoms of overt heart failure, arrhythmia or angina. BP well controlled on current regimen. Tolerating statin - lipids followed by PCP.  9/2017 echo showed normally function bioprosthetic AVR - recheck echo in 6 months. Patient is compliant with medication regimen.     BP Readings

## 2018-08-22 DIAGNOSIS — E11.9 TYPE 2 DIABETES MELLITUS WITHOUT COMPLICATION, WITHOUT LONG-TERM CURRENT USE OF INSULIN (HCC): ICD-10-CM

## 2018-08-22 RX ORDER — METFORMIN HYDROCHLORIDE 500 MG/1
500 TABLET, EXTENDED RELEASE ORAL 2 TIMES DAILY
Qty: 180 TABLET | Refills: 0 | Status: SHIPPED | OUTPATIENT
Start: 2018-08-24 | End: 2018-11-26 | Stop reason: SDUPTHER

## 2018-08-28 DIAGNOSIS — E78.5 HYPERLIPIDEMIA, UNSPECIFIED HYPERLIPIDEMIA TYPE: ICD-10-CM

## 2018-08-28 RX ORDER — ATORVASTATIN CALCIUM 40 MG/1
40 TABLET, FILM COATED ORAL DAILY
Qty: 30 TABLET | Refills: 11 | Status: SHIPPED | OUTPATIENT
Start: 2018-08-28 | End: 2019-06-13 | Stop reason: SDUPTHER

## 2018-10-01 DIAGNOSIS — I10 WELL-CONTROLLED HYPERTENSION: ICD-10-CM

## 2018-10-01 RX ORDER — LISINOPRIL AND HYDROCHLOROTHIAZIDE 25; 20 MG/1; MG/1
TABLET ORAL
Qty: 30 TABLET | Refills: 1 | Status: SHIPPED | OUTPATIENT
Start: 2018-10-01 | End: 2018-11-26 | Stop reason: SDUPTHER

## 2018-11-26 ENCOUNTER — OFFICE VISIT (OUTPATIENT)
Dept: PRIMARY CARE CLINIC | Age: 68
End: 2018-11-26
Payer: MEDICARE

## 2018-11-26 VITALS
OXYGEN SATURATION: 99 % | WEIGHT: 186 LBS | SYSTOLIC BLOOD PRESSURE: 114 MMHG | DIASTOLIC BLOOD PRESSURE: 62 MMHG | BODY MASS INDEX: 34.23 KG/M2 | HEART RATE: 72 BPM | TEMPERATURE: 97.8 F | HEIGHT: 62 IN

## 2018-11-26 DIAGNOSIS — E78.5 HYPERLIPIDEMIA, UNSPECIFIED HYPERLIPIDEMIA TYPE: ICD-10-CM

## 2018-11-26 DIAGNOSIS — Z23 NEED FOR PNEUMOCOCCAL VACCINATION: ICD-10-CM

## 2018-11-26 DIAGNOSIS — E11.9 DIABETES MELLITUS WITHOUT COMPLICATION (HCC): ICD-10-CM

## 2018-11-26 DIAGNOSIS — Z23 NEED FOR INFLUENZA VACCINATION: Primary | ICD-10-CM

## 2018-11-26 DIAGNOSIS — I10 WELL-CONTROLLED HYPERTENSION: ICD-10-CM

## 2018-11-26 DIAGNOSIS — E11.9 TYPE 2 DIABETES MELLITUS WITHOUT COMPLICATION, WITHOUT LONG-TERM CURRENT USE OF INSULIN (HCC): ICD-10-CM

## 2018-11-26 PROCEDURE — 2022F DILAT RTA XM EVC RTNOPTHY: CPT | Performed by: NURSE PRACTITIONER

## 2018-11-26 PROCEDURE — 1101F PT FALLS ASSESS-DOCD LE1/YR: CPT | Performed by: NURSE PRACTITIONER

## 2018-11-26 PROCEDURE — 1090F PRES/ABSN URINE INCON ASSESS: CPT | Performed by: NURSE PRACTITIONER

## 2018-11-26 PROCEDURE — G8417 CALC BMI ABV UP PARAM F/U: HCPCS | Performed by: NURSE PRACTITIONER

## 2018-11-26 PROCEDURE — 99212 OFFICE O/P EST SF 10 MIN: CPT | Performed by: NURSE PRACTITIONER

## 2018-11-26 PROCEDURE — 1123F ACP DISCUSS/DSCN MKR DOCD: CPT | Performed by: NURSE PRACTITIONER

## 2018-11-26 PROCEDURE — 1036F TOBACCO NON-USER: CPT | Performed by: NURSE PRACTITIONER

## 2018-11-26 PROCEDURE — 4040F PNEUMOC VAC/ADMIN/RCVD: CPT | Performed by: NURSE PRACTITIONER

## 2018-11-26 PROCEDURE — G0008 ADMIN INFLUENZA VIRUS VAC: HCPCS | Performed by: NURSE PRACTITIONER

## 2018-11-26 PROCEDURE — G8482 FLU IMMUNIZE ORDER/ADMIN: HCPCS | Performed by: NURSE PRACTITIONER

## 2018-11-26 PROCEDURE — 3017F COLORECTAL CA SCREEN DOC REV: CPT | Performed by: NURSE PRACTITIONER

## 2018-11-26 PROCEDURE — G8598 ASA/ANTIPLAT THER USED: HCPCS | Performed by: NURSE PRACTITIONER

## 2018-11-26 PROCEDURE — G0009 ADMIN PNEUMOCOCCAL VACCINE: HCPCS | Performed by: NURSE PRACTITIONER

## 2018-11-26 PROCEDURE — G8399 PT W/DXA RESULTS DOCUMENT: HCPCS | Performed by: NURSE PRACTITIONER

## 2018-11-26 PROCEDURE — 90670 PCV13 VACCINE IM: CPT | Performed by: NURSE PRACTITIONER

## 2018-11-26 PROCEDURE — 3044F HG A1C LEVEL LT 7.0%: CPT | Performed by: NURSE PRACTITIONER

## 2018-11-26 PROCEDURE — 90662 IIV NO PRSV INCREASED AG IM: CPT | Performed by: NURSE PRACTITIONER

## 2018-11-26 PROCEDURE — G8427 DOCREV CUR MEDS BY ELIG CLIN: HCPCS | Performed by: NURSE PRACTITIONER

## 2018-11-26 RX ORDER — LANCETS 30 GAUGE
1 EACH MISCELLANEOUS DAILY
Qty: 100 EACH | Refills: 5 | Status: SHIPPED | OUTPATIENT
Start: 2018-11-26 | End: 2021-04-22

## 2018-11-26 RX ORDER — METFORMIN HYDROCHLORIDE 500 MG/1
500 TABLET, EXTENDED RELEASE ORAL 2 TIMES DAILY
Qty: 180 TABLET | Refills: 1 | Status: SHIPPED | OUTPATIENT
Start: 2018-11-26 | End: 2019-05-29 | Stop reason: SDUPTHER

## 2018-11-26 RX ORDER — LISINOPRIL AND HYDROCHLOROTHIAZIDE 25; 20 MG/1; MG/1
TABLET ORAL
Qty: 90 TABLET | Refills: 3 | Status: SHIPPED | OUTPATIENT
Start: 2018-11-26 | End: 2019-03-19 | Stop reason: DRUGHIGH

## 2018-11-26 NOTE — PROGRESS NOTES
Take 81 mg by mouth nightly  Yes Historical Provider, MD        Social History   Substance Use Topics    Smoking status: Never Smoker    Smokeless tobacco: Never Used    Alcohol use No        Vitals:    11/26/18 1132   BP: 114/62   Pulse: 72   Temp: 97.8 °F (36.6 °C)   SpO2: 99%   Weight: 186 lb (84.4 kg)   Height: 5' 2\" (1.575 m)     Estimated body mass index is 34.02 kg/m² as calculated from the following:    Height as of this encounter: 5' 2\" (1.575 m). Weight as of this encounter: 186 lb (84.4 kg). Physical Exam   Constitutional: She is oriented to person, place, and time. She appears well-developed and well-nourished. HENT:   Head: Normocephalic and atraumatic. Eyes: Pupils are equal, round, and reactive to light. Neck: Normal range of motion. Neck supple. Cardiovascular: Normal rate, regular rhythm, normal heart sounds and intact distal pulses. Pulmonary/Chest: Effort normal and breath sounds normal.   Abdominal: Soft. Bowel sounds are normal.   Musculoskeletal: Normal range of motion. Neurological: She is alert and oriented to person, place, and time. Skin: Skin is warm and dry. Psychiatric: She has a normal mood and affect. Her behavior is normal.   Nursing note and vitals reviewed. ASSESSMENT/PLAN:      CAROLYN MARAVILLA was seen today for diabetes, hypertension and hyperlipidemia. Diagnoses and all orders for this visit:    Need for influenza vaccination  -     INFLUENZA, HIGH DOSE, 65 YRS +, IM, PF, PREFILL SYR, 0.5ML (FLUZONE HD)    Well-controlled hypertension  -     lisinopril-hydrochlorothiazide (PRINZIDE;ZESTORETIC) 20-25 MG per tablet; TAKE ONE TABLET BY MOUTH ONCE DAILY  -     CBC; Future  -     Comprehensive Metabolic Panel; Future    Type 2 diabetes mellitus without complication, without long-term current use of insulin (HCC)  -     metFORMIN (GLUCOPHAGE-XR) 500 MG extended release tablet;  Take 1 tablet by mouth 2 times daily    Diabetes mellitus without complication

## 2018-11-27 ASSESSMENT — ENCOUNTER SYMPTOMS
CHEST TIGHTNESS: 0
APNEA: 0
VOMITING: 0
WHEEZING: 0
DIARRHEA: 0
NAUSEA: 0
COLOR CHANGE: 0
ABDOMINAL PAIN: 0
ABDOMINAL DISTENTION: 0
BACK PAIN: 0
COUGH: 0
SHORTNESS OF BREATH: 0
CONSTIPATION: 0

## 2019-03-19 ENCOUNTER — OFFICE VISIT (OUTPATIENT)
Dept: CARDIOLOGY | Age: 69
End: 2019-03-19
Payer: MEDICARE

## 2019-03-19 VITALS
HEIGHT: 62 IN | SYSTOLIC BLOOD PRESSURE: 106 MMHG | DIASTOLIC BLOOD PRESSURE: 76 MMHG | BODY MASS INDEX: 33.68 KG/M2 | WEIGHT: 183 LBS | HEART RATE: 62 BPM

## 2019-03-19 DIAGNOSIS — Z95.2 S/P AVR: ICD-10-CM

## 2019-03-19 DIAGNOSIS — I10 ESSENTIAL HYPERTENSION: ICD-10-CM

## 2019-03-19 DIAGNOSIS — I25.10 ARTERIOSCLEROTIC HEART DISEASE (ASHD): Primary | ICD-10-CM

## 2019-03-19 DIAGNOSIS — E78.2 MIXED HYPERLIPIDEMIA: ICD-10-CM

## 2019-03-19 DIAGNOSIS — I38 VALVULAR HEART DISEASE: ICD-10-CM

## 2019-03-19 PROCEDURE — 3017F COLORECTAL CA SCREEN DOC REV: CPT | Performed by: CLINICAL NURSE SPECIALIST

## 2019-03-19 PROCEDURE — G8417 CALC BMI ABV UP PARAM F/U: HCPCS | Performed by: CLINICAL NURSE SPECIALIST

## 2019-03-19 PROCEDURE — 1036F TOBACCO NON-USER: CPT | Performed by: CLINICAL NURSE SPECIALIST

## 2019-03-19 PROCEDURE — G8482 FLU IMMUNIZE ORDER/ADMIN: HCPCS | Performed by: CLINICAL NURSE SPECIALIST

## 2019-03-19 PROCEDURE — G8399 PT W/DXA RESULTS DOCUMENT: HCPCS | Performed by: CLINICAL NURSE SPECIALIST

## 2019-03-19 PROCEDURE — G8427 DOCREV CUR MEDS BY ELIG CLIN: HCPCS | Performed by: CLINICAL NURSE SPECIALIST

## 2019-03-19 PROCEDURE — G8598 ASA/ANTIPLAT THER USED: HCPCS | Performed by: CLINICAL NURSE SPECIALIST

## 2019-03-19 PROCEDURE — 99214 OFFICE O/P EST MOD 30 MIN: CPT | Performed by: CLINICAL NURSE SPECIALIST

## 2019-03-19 PROCEDURE — 4040F PNEUMOC VAC/ADMIN/RCVD: CPT | Performed by: CLINICAL NURSE SPECIALIST

## 2019-03-19 PROCEDURE — 1101F PT FALLS ASSESS-DOCD LE1/YR: CPT | Performed by: CLINICAL NURSE SPECIALIST

## 2019-03-19 PROCEDURE — 1123F ACP DISCUSS/DSCN MKR DOCD: CPT | Performed by: CLINICAL NURSE SPECIALIST

## 2019-03-19 PROCEDURE — 1090F PRES/ABSN URINE INCON ASSESS: CPT | Performed by: CLINICAL NURSE SPECIALIST

## 2019-03-19 RX ORDER — LISINOPRIL 20 MG/1
20 TABLET ORAL DAILY
Qty: 30 TABLET | Refills: 5 | Status: SHIPPED | OUTPATIENT
Start: 2019-03-19 | End: 2019-07-25 | Stop reason: SDUPTHER

## 2019-03-19 ASSESSMENT — ENCOUNTER SYMPTOMS
WHEEZING: 0
EYE REDNESS: 0
COUGH: 0
ABDOMINAL PAIN: 0
CHEST TIGHTNESS: 0
SHORTNESS OF BREATH: 0
FACIAL SWELLING: 0
NAUSEA: 0
VOMITING: 0

## 2019-05-08 DIAGNOSIS — E11.9 DIABETES MELLITUS WITHOUT COMPLICATION (HCC): ICD-10-CM

## 2019-05-08 DIAGNOSIS — I10 ESSENTIAL HYPERTENSION: ICD-10-CM

## 2019-05-08 DIAGNOSIS — E11.9 TYPE 2 DIABETES MELLITUS WITHOUT COMPLICATION, WITHOUT LONG-TERM CURRENT USE OF INSULIN (HCC): ICD-10-CM

## 2019-05-08 DIAGNOSIS — E78.5 HYPERLIPIDEMIA, UNSPECIFIED HYPERLIPIDEMIA TYPE: ICD-10-CM

## 2019-05-08 DIAGNOSIS — I10 WELL-CONTROLLED HYPERTENSION: ICD-10-CM

## 2019-05-08 LAB
ALBUMIN SERPL-MCNC: 4.2 G/DL (ref 3.5–5.2)
ALP BLD-CCNC: 68 U/L (ref 35–104)
ALT SERPL-CCNC: 16 U/L (ref 5–33)
ANION GAP SERPL CALCULATED.3IONS-SCNC: 10 MMOL/L (ref 7–19)
AST SERPL-CCNC: 20 U/L (ref 5–32)
BILIRUB SERPL-MCNC: 0.4 MG/DL (ref 0.2–1.2)
BUN BLDV-MCNC: 15 MG/DL (ref 8–23)
CALCIUM SERPL-MCNC: 9.7 MG/DL (ref 8.8–10.2)
CHLORIDE BLD-SCNC: 105 MMOL/L (ref 98–111)
CHOLESTEROL, TOTAL: 159 MG/DL (ref 160–199)
CO2: 31 MMOL/L (ref 22–29)
CREAT SERPL-MCNC: 0.7 MG/DL (ref 0.5–0.9)
CREATININE URINE: 56.8 MG/DL (ref 4.2–622)
GFR NON-AFRICAN AMERICAN: >60
GLUCOSE BLD-MCNC: 84 MG/DL (ref 74–109)
HCT VFR BLD CALC: 37.6 % (ref 37–47)
HDLC SERPL-MCNC: 39 MG/DL (ref 65–121)
HEMOGLOBIN: 12.4 G/DL (ref 12–16)
LDL CHOLESTEROL CALCULATED: 90 MG/DL
MCH RBC QN AUTO: 28.4 PG (ref 27–31)
MCHC RBC AUTO-ENTMCNC: 33 G/DL (ref 33–37)
MCV RBC AUTO: 86.2 FL (ref 81–99)
MICROALBUMIN UR-MCNC: <1.2 MG/DL (ref 0–19)
MICROALBUMIN/CREAT UR-RTO: NORMAL MG/G
PDW BLD-RTO: 13 % (ref 11.5–14.5)
PLATELET # BLD: 250 K/UL (ref 130–400)
PMV BLD AUTO: 9.7 FL (ref 9.4–12.3)
POTASSIUM SERPL-SCNC: 3.9 MMOL/L (ref 3.5–5)
RBC # BLD: 4.36 M/UL (ref 4.2–5.4)
SODIUM BLD-SCNC: 146 MMOL/L (ref 136–145)
TOTAL PROTEIN: 7.2 G/DL (ref 6.6–8.7)
TRIGL SERPL-MCNC: 149 MG/DL (ref 0–149)
WBC # BLD: 6.7 K/UL (ref 4.8–10.8)

## 2019-05-22 ENCOUNTER — TELEPHONE (OUTPATIENT)
Dept: PRIMARY CARE CLINIC | Age: 69
End: 2019-05-22

## 2019-05-22 NOTE — TELEPHONE ENCOUNTER
Letter mailed  Please notify patient of abnormal results. Cholesterol levels are stable overall her HDL remains low no medication changes focus on diet and exercise. Associated Results     Result Notes for Lipid Panel     Notes recorded by LULA Greenwood on 5/13/2019 at 10:57 AM CDT  Please notify patient of abnormal results. Cholesterol levels are stable overall her HDL remains low no medication changes focus on diet and exercise. Contains abnormal data Lipid Panel   Order: [id number]   Status:  Final result   Visible to patient:  No (Not Released) Dx:  Hyperlipidemia, unspecified hyperlipi. ..     Ref Range & Units 2wk ago  (5/8/19) 1yr ago  (1/15/18) 1yr ago  (6/8/17)   Cholesterol, Total 160 - 199 mg/dL 159Low   205High   CM   Comment: <160 MG/DL=OPTIMAL     160-199 MG/DL= DESIRABLE     200-239 MG/DL=BORDERLINE-INCREASED RISK OF ATHEROSCLEROTIC   CARDIOVASCULAR DISEASE     > OR = 240 MG/DL-ASSOCIATED WITH AN INCREASED RISK OF   ATHROSCLEROTIC CARDIOVASCULAR DISEASE    Triglycerides 0 - 149 mg/dL 149  250High   202High  R   HDL 65 - 121 mg/dL 39Low   39Low  CM 32Low  CM   Comment: VALUES>60 MG/DL ARE ASSOCIATED WITH A DECREASED RISK OF   ATHEROSCLEROTIC CARDIOVASCULAR DISEASE    LDL Calculated <100 mg/dL 90  116 CM 99 CM   Comment: <100 MG/DL=OPITIMAL     100-129 MG/DL=DESIRABLE     130-159 MG/DL BORDERLINE=INCREASED RISK OF ATHEROSCLEROTIC   CARDIOVASCULAR DISEASE     > OR = 160 MG/DL=ASSOCIATED WITH AN INCREASE RISK OF   ATHEROSCLEROTIC CARDIOVASCULAR DISEASE    Resulting Agency  [address] [place] [address] Lab         Specimen Collected: 05/08/19 08:48 Last Resulted: 05/08/19 11:03         Lab Flowsheet       Order Details       View Encounter       Lab and Collection Details       Routing       Result History         CM=Additional comments  R=Reference range differs from displayed range           Result Notes for Comprehensive Metabolic Panel Notes recorded by LULA Landry on 5/13/2019 at 10:57 AM CDT  Please notify patient of abnormal results. Cholesterol levels are stable overall her HDL remains low no medication changes focus on diet and exercise. Contains abnormal data Comprehensive Metabolic Panel   Order: [id number]   Status:  Final result   Visible to patient:  No (Not Released) Dx:  Well-controlled hypertension    Ref Range & Units 2wk ago  (5/8/19) 1yr ago  (1/15/18) 1yr ago  (6/8/17)   Sodium 136 - 145 mmol/L 146High   142  141    Potassium 3.5 - 5.0 mmol/L 3.9  3.7  3.7    Chloride 98 - 111 mmol/L 105  99  98    CO2 22 - 29 mmol/L 31High   30High   27    Anion Gap 7 - 19 mmol/L 10  13  16    Glucose 74 - 109 mg/dL 84  84  88    BUN 8 - 23 mg/dL 15  15  19    CREATININE 0.5 - 0.9 mg/dL 0.7  0.8  0.7    GFR Non- >60 >60  >60 CM >60 CM   Comment: This calculation may be inaccurate for patients under the age of 25 years. For ages 25 and older, a GFR >60 mL/min/1.73m2 (not corrected for weight) is   valid for stable renal function. Calcium 8.8 - 10.2 mg/dL 9.7  9.9  9.6    Total Protein 6.6 - 8.7 g/dL 7.2  7.8  7.6    Alb 3.5 - 5.2 g/dL 4.2  4.5  4.0    Total Bilirubin 0.2 - 1.2 mg/dL 0.4  0.5  0.4    Alkaline Phosphatase 35 - 104 U/L 68  89  78    ALT 5 - 33 U/L 16  21  18    AST 5 - 32 U/L 20  27  19    Resulting Agency  [address] [place] [address] Lab         Specimen Collected: 05/08/19 08:48 Last Resulted: 05/08/19 11:03         Lab Flowsheet       Order Details       View Encounter       Lab and Collection Details       Routing       Result History         CM=Additional comments           Result Notes for CBC     Notes recorded by LULA Landry on 5/13/2019 at 10:57 AM CDT  Please notify patient of abnormal results. Cholesterol levels are stable overall her HDL remains low no medication changes focus on diet and exercise.    CBC   Order: [id number]   Status: Final result   Visible to patient:  No (Not Released) Dx:  Diabetes mellitus without complicatio. ..     Ref Range & Units 2wk ago  (5/8/19) 1yr ago  (1/15/18) 1yr ago  (6/8/17)   WBC 4.8 - 10.8 K/uL 6.7  7.2  9.8    RBC 4.20 - 5.40 M/uL 4.36  4.58  4.52    Hemoglobin 12.0 - 16.0 g/dL 12.4  13.0  12.7    Hematocrit 37.0 - 47.0 % 37.6  38.9  37.7    MCV 81.0 - 99.0 fL 86.2  84.9  83.4    MCH 27.0 - 31.0 pg 28.4  28.4  28.1    MCHC 33.0 - 37.0 g/dL 33.0  33.4  33.7    RDW 11.5 - 14.5 % 13.0  13.7  12.4    Platelets 015 - 028 K/uL 250  306  371    MPV 9.4 - 12.3 fL 9.7  9.4  8.7Low     Resulting Agency  [address] [place] [address] Lab         Specimen Collected: 05/08/19 08:48 Last Resulted: 05/08/19 10:38         Lab Flowsheet       Order Details       View Encounter       Lab and Collection Details       Routing       Result History               Status of Other Orders     Completed     Microalbumin / Creatinine Urine Ratio  05/08/19

## 2019-05-22 NOTE — LETTER
[address]  [address]  Phone: [phone]  Fax: [phone] [facility], APRN        May 22, 2019     [address] [facility] [address]      Dear Dyana:    Below are the results from your recent visit:    Resulted Orders   Lipid Panel   Result Value Ref Range    Cholesterol, Total 159 (L) 160 - 199 mg/dL      Comment:      <160 MG/DL=OPTIMAL    160-199 MG/DL= DESIRABLE    200-239 MG/DL=BORDERLINE-INCREASED RISK OF ATHEROSCLEROTIC  CARDIOVASCULAR DISEASE    > OR = 240 MG/DL-ASSOCIATED WITH AN INCREASED RISK OF  ATHROSCLEROTIC CARDIOVASCULAR DISEASE      Triglycerides 149 0 - 149 mg/dL    HDL 39 (L) 65 - 121 mg/dL      Comment:      VALUES>60 MG/DL ARE ASSOCIATED WITH A DECREASED RISK OF  ATHEROSCLEROTIC CARDIOVASCULAR DISEASE      LDL Calculated 90 <100 mg/dL      Comment:      <100 MG/DL=OPITIMAL    100-129 MG/DL=DESIRABLE    130-159 MG/DL BORDERLINE=INCREASED RISK OF ATHEROSCLEROTIC  CARDIOVASCULAR DISEASE    > OR = 160 MG/DL=ASSOCIATED WITH AN INCREASE RISK OF  ATHEROSCLEROTIC CARDIOVASCULAR DISEASE     Comprehensive Metabolic Panel   Result Value Ref Range    Sodium 146 (H) 136 - 145 mmol/L    Potassium 3.9 3.5 - 5.0 mmol/L    Chloride 105 98 - 111 mmol/L    CO2 31 (H) 22 - 29 mmol/L    Anion Gap 10 7 - 19 mmol/L    Glucose 84 74 - 109 mg/dL    BUN 15 8 - 23 mg/dL    CREATININE 0.7 0.5 - 0.9 mg/dL    GFR Non-African American >60 >60      Comment: This calculation may be inaccurate for patients under the age of 25 years. For ages 25 and older, a GFR >60 mL/min/1.73m2 (not corrected for weight) is  valid for stable renal function.       Calcium 9.7 8.8 - 10.2 mg/dL    Total Protein 7.2 6.6 - 8.7 g/dL    Alb 4.2 3.5 - 5.2 g/dL    Total Bilirubin 0.4 0.2 - 1.2 mg/dL    Alkaline Phosphatase 68 35 - 104 U/L    ALT 16 5 - 33 U/L    AST 20 5 - 32 U/L   CBC   Result Value Ref Range    WBC 6.7 4.8 - 10.8 K/uL RBC 4.36 4.20 - 5.40 M/uL    Hemoglobin 12.4 12.0 - 16.0 g/dL    Hematocrit 37.6 37.0 - 47.0 %    MCV 86.2 81.0 - 99.0 fL    MCH 28.4 27.0 - 31.0 pg    MCHC 33.0 33.0 - 37.0 g/dL    RDW 13.0 11.5 - 14.5 %    Platelets 255 190 - 473 K/uL    MPV 9.7 9.4 - 12.3 fL     Your labs are stable overall. Need to focus on diet and exercise. If you have any questions or concerns, please don't hesitate to call.     Sincerely,        LULA Clinton

## 2019-05-29 DIAGNOSIS — E11.9 TYPE 2 DIABETES MELLITUS WITHOUT COMPLICATION, WITHOUT LONG-TERM CURRENT USE OF INSULIN (HCC): Primary | ICD-10-CM

## 2019-05-29 RX ORDER — METFORMIN HYDROCHLORIDE 500 MG/1
500 TABLET, EXTENDED RELEASE ORAL 2 TIMES DAILY
Qty: 180 TABLET | Refills: 1 | Status: SHIPPED | OUTPATIENT
Start: 2019-05-29 | End: 2019-12-02 | Stop reason: SDUPTHER

## 2019-05-29 NOTE — TELEPHONE ENCOUNTER
Kristan Adam called to request a refill on her medication. Last office visit : 11/26/2018   Next office visit : 6/13/2019     Requested Prescriptions     Pending Prescriptions Disp Refills    metFORMIN (GLUCOPHAGE-XR) 500 MG extended release tablet 180 tablet 1     Sig: Take 1 tablet by mouth 2 times daily            Katie Fitzgerald MA      Refill has been sent and pt. Notified.

## 2019-06-13 ENCOUNTER — OFFICE VISIT (OUTPATIENT)
Dept: PRIMARY CARE CLINIC | Age: 69
End: 2019-06-13
Payer: MEDICARE

## 2019-06-13 VITALS
WEIGHT: 184 LBS | OXYGEN SATURATION: 97 % | SYSTOLIC BLOOD PRESSURE: 120 MMHG | DIASTOLIC BLOOD PRESSURE: 78 MMHG | TEMPERATURE: 98.1 F | HEIGHT: 62 IN | HEART RATE: 70 BPM | BODY MASS INDEX: 33.86 KG/M2

## 2019-06-13 DIAGNOSIS — I48.91 POSTOPERATIVE ATRIAL FIBRILLATION (HCC): Chronic | ICD-10-CM

## 2019-06-13 DIAGNOSIS — E78.5 HYPERLIPIDEMIA, UNSPECIFIED HYPERLIPIDEMIA TYPE: ICD-10-CM

## 2019-06-13 DIAGNOSIS — E11.9 COMPREHENSIVE DIABETIC FOOT EXAMINATION, TYPE 2 DM, ENCOUNTER FOR (HCC): ICD-10-CM

## 2019-06-13 DIAGNOSIS — I97.89 POSTOPERATIVE ATRIAL FIBRILLATION (HCC): Chronic | ICD-10-CM

## 2019-06-13 DIAGNOSIS — E11.9 TYPE 2 DIABETES MELLITUS WITHOUT COMPLICATION, WITHOUT LONG-TERM CURRENT USE OF INSULIN (HCC): Primary | Chronic | ICD-10-CM

## 2019-06-13 DIAGNOSIS — Z12.31 ENCOUNTER FOR SCREENING MAMMOGRAM FOR BREAST CANCER: ICD-10-CM

## 2019-06-13 DIAGNOSIS — I10 WELL-CONTROLLED HYPERTENSION: Chronic | ICD-10-CM

## 2019-06-13 PROCEDURE — 99214 OFFICE O/P EST MOD 30 MIN: CPT | Performed by: NURSE PRACTITIONER

## 2019-06-13 PROCEDURE — 4040F PNEUMOC VAC/ADMIN/RCVD: CPT | Performed by: NURSE PRACTITIONER

## 2019-06-13 PROCEDURE — 3017F COLORECTAL CA SCREEN DOC REV: CPT | Performed by: NURSE PRACTITIONER

## 2019-06-13 PROCEDURE — 3046F HEMOGLOBIN A1C LEVEL >9.0%: CPT | Performed by: NURSE PRACTITIONER

## 2019-06-13 PROCEDURE — 83036 HEMOGLOBIN GLYCOSYLATED A1C: CPT | Performed by: NURSE PRACTITIONER

## 2019-06-13 PROCEDURE — G8598 ASA/ANTIPLAT THER USED: HCPCS | Performed by: NURSE PRACTITIONER

## 2019-06-13 PROCEDURE — G8427 DOCREV CUR MEDS BY ELIG CLIN: HCPCS | Performed by: NURSE PRACTITIONER

## 2019-06-13 PROCEDURE — 1123F ACP DISCUSS/DSCN MKR DOCD: CPT | Performed by: NURSE PRACTITIONER

## 2019-06-13 PROCEDURE — 2022F DILAT RTA XM EVC RTNOPTHY: CPT | Performed by: NURSE PRACTITIONER

## 2019-06-13 PROCEDURE — 1036F TOBACCO NON-USER: CPT | Performed by: NURSE PRACTITIONER

## 2019-06-13 PROCEDURE — G8399 PT W/DXA RESULTS DOCUMENT: HCPCS | Performed by: NURSE PRACTITIONER

## 2019-06-13 PROCEDURE — G8417 CALC BMI ABV UP PARAM F/U: HCPCS | Performed by: NURSE PRACTITIONER

## 2019-06-13 PROCEDURE — 1090F PRES/ABSN URINE INCON ASSESS: CPT | Performed by: NURSE PRACTITIONER

## 2019-06-13 RX ORDER — ATORVASTATIN CALCIUM 40 MG/1
40 TABLET, FILM COATED ORAL DAILY
Qty: 30 TABLET | Refills: 11 | Status: SHIPPED | OUTPATIENT
Start: 2019-06-13 | End: 2020-06-18 | Stop reason: SDUPTHER

## 2019-06-13 ASSESSMENT — ENCOUNTER SYMPTOMS
BACK PAIN: 0
APNEA: 0
VOMITING: 0
COUGH: 0
DIARRHEA: 0
ABDOMINAL DISTENTION: 0
CONSTIPATION: 0
CHEST TIGHTNESS: 0
ABDOMINAL PAIN: 0
SHORTNESS OF BREATH: 0
WHEEZING: 0
COLOR CHANGE: 0
NAUSEA: 0

## 2019-06-13 ASSESSMENT — PATIENT HEALTH QUESTIONNAIRE - PHQ9
2. FEELING DOWN, DEPRESSED OR HOPELESS: 1
SUM OF ALL RESPONSES TO PHQ9 QUESTIONS 1 & 2: 1
1. LITTLE INTEREST OR PLEASURE IN DOING THINGS: 0
SUM OF ALL RESPONSES TO PHQ QUESTIONS 1-9: 1
SUM OF ALL RESPONSES TO PHQ QUESTIONS 1-9: 1

## 2019-06-13 NOTE — PROGRESS NOTES
2019     Chirag Valladares (:  1950) is a 78 y.o. female, here for evaluation of the following medical concerns:  Chief Complaint   Patient presents with    Diabetes    Hypertension    Hyperlipidemia     HPI  Treatment Adherence:   Medication compliance:  compliant all of the time  Diet compliance:  compliant most of the time  Weight trend: stable  Current exercise: walks 7 time(s) per week, [employer] 3 TIMES DAILY 10 MINUTE INCREMENTS. Barriers: none    Diabetes Mellitus Type 2: Current symptoms/problems include none. Home blood sugar records: fasting range:   Any episodes of hypoglycemia? yes - X 1  Eye exam current (within one year): no  Tobacco history: She  reports that she has never smoked. She has never used smokeless tobacco.   Daily Aspirin? Yes    Hypertension:  Home blood pressure monitoring: Yes - 'S,DBP70'S. She is adherent to a low sodium diet. Patient denies chest pain, blurred vision and fatigue. Antihypertensive medication side effects: no medication side effects noted. Use of agents associated with hypertension: none. Hyperlipidemia:  No new myalgias or GI upset on atorvastatin (Lipitor). Lab Results   Component Value Date    LABA1C 5.7 01/15/2018    LABA1C 5.9 2017    LABA1C 5.8 2016     Lab Results   Component Value Date    LABMICR <1.20 2019    CREATININE 0.7 2019     Lab Results   Component Value Date    ALT 16 2019    AST 20 2019     Lab Results   Component Value Date    CHOL 159 (L) 2019    TRIG 149 2019    HDL 39 (L) 2019    LDLCALC 90 2019    LDLDIRECT 111 08/10/2015          Review of Systems   Constitutional: Negative for fatigue, fever and unexpected weight change. Respiratory: Negative for apnea, cough, chest tightness, shortness of breath and wheezing. Cardiovascular: Negative for chest pain, palpitations and leg swelling.    Gastrointestinal: Negative for abdominal distention, abdominal pain, constipation, diarrhea, nausea and vomiting. Endocrine: Negative for cold intolerance, heat intolerance, polydipsia, polyphagia and polyuria. Genitourinary: Negative for difficulty urinating, dysuria, enuresis, hematuria, menstrual problem, pelvic pain, urgency, vaginal bleeding, vaginal discharge and vaginal pain. Musculoskeletal: Negative for back pain, gait problem, joint swelling, neck pain and neck stiffness. Skin: Negative for color change, pallor, rash and wound. Allergic/Immunologic: Negative for environmental allergies. Neurological: Negative for dizziness, tremors, seizures, syncope, facial asymmetry, speech difficulty, weakness, light-headedness, numbness and headaches. Psychiatric/Behavioral: Negative for agitation, behavioral problems, confusion, decreased concentration, dysphoric mood and hallucinations. The patient is not nervous/anxious and is not hyperactive. Prior to Visit Medications    Medication Sig Taking? Authorizing Provider   atorvastatin (LIPITOR) 40 MG tablet Take 1 tablet by mouth daily Yes LULA Murillo   metFORMIN (GLUCOPHAGE-XR) 500 MG extended release tablet Take 1 tablet by mouth 2 times daily Yes LULA Murillo   lisinopril (PRINIVIL;ZESTRIL) 20 MG tablet Take 1 tablet by mouth daily Yes LULA Ornelas   Lancets MISC 1 each by Does not apply route daily Dx:e11.9 Yes LULA Murillo   blood glucose test strips (ASCENSIA AUTODISC VI;ONE TOUCH ULTRA TEST VI) strip 1 each by In Vitro route daily As needed.  Dx.e11.9 Yes LULA Murillo   Multiple Vitamins-Minerals (THERAPEUTIC MULTIVITAMIN-MINERALS) tablet Take 1 tablet by mouth daily Yes Historical Provider, MD   aspirin 81 MG tablet Take 81 mg by mouth nightly  Yes Historical Provider, MD        Social History     Tobacco Use    Smoking status: Never Smoker    Smokeless tobacco: Never Used   Substance Use Topics    Alcohol use: No        Vitals:    06/13/19 1316   BP: 120/78   Pulse: 70   Temp: 98.1 °F (36.7 °C)   SpO2: 97%   Weight: 184 lb (83.5 kg)   Height: 5' 2\" (1.575 m)     Estimated body mass index is 33.65 kg/m² as calculated from the following:    Height as of this encounter: 5' 2\" (1.575 m). Weight as of this encounter: 184 lb (83.5 kg). Physical Exam   Constitutional: She is oriented to person, place, and time. She appears well-developed and well-nourished. HENT:   Head: Normocephalic and atraumatic. Eyes: Pupils are equal, round, and reactive to light. Neck: Normal range of motion. Neck supple. Cardiovascular: Normal rate, regular rhythm, normal heart sounds and intact distal pulses. Pulmonary/Chest: Effort normal and breath sounds normal.   Abdominal: Soft. Bowel sounds are normal.   Musculoskeletal: Normal range of motion. Neurological: She is alert and oriented to person, place, and time. Skin: Skin is warm and dry. Psychiatric: She has a normal mood and affect. Her behavior is normal.   Nursing note and vitals reviewed. Monofilament Exam Reveals:  Pulses: normal  Edema:normal  Skin Lesions: Thick skin bilateral 1,thick yellow brittle nails  Right Foot:    Left Foot:  Normal sensation at all  Normal sensation at all           Shalini Alexis was seen today for diabetes, hypertension and hyperlipidemia. Diagnoses and all orders for this visit:    Type 2 diabetes mellitus without complication, without long-term current use of insulin ([facility])  Comments:  Blood sugars are well controlled recommend continuing current diet and exercise regimen, patient is compliant with medication regimen  Orders:  -     POCT glycosylated hemoglobin (Hb A1C)  -      DIABETES FOOT EXAM  -      DIABETES FOOT EXAM    Encounter for screening mammogram for breast cancer  -     [facility] Digital Screen Bilateral [[id number]]; Future    Hyperlipidemia, unspecified hyperlipidemia type  -     atorvastatin (LIPITOR) 40 MG tablet;  Take 1 tablet by mouth daily    Comprehensive diabetic foot examination, type 2 DM, encounter for [facility])  Comments:  Recommend focus on foot care, patient also has some possible nail fungus, recommend Lotramine. cream    Well-controlled hypertension  Comments:  Continue current medication exercise and diet. Postoperative atrial fibrillation (HCC)  Comments:  Normal rate and rhythm and heart tones today, follow-up with cardiology is arranged      Return in about 6 months (around 12/13/2019), or if symptoms worsen or fail to improve. An electronic signature was used to authenticate this note.     --LULA Lauren on 6/13/2019 at 10:27 PM

## 2019-07-25 RX ORDER — LISINOPRIL 20 MG/1
20 TABLET ORAL DAILY
Qty: 90 TABLET | Refills: 3 | Status: SHIPPED | OUTPATIENT
Start: 2019-07-25 | End: 2020-09-16

## 2019-12-02 DIAGNOSIS — E11.9 TYPE 2 DIABETES MELLITUS WITHOUT COMPLICATION, WITHOUT LONG-TERM CURRENT USE OF INSULIN (HCC): ICD-10-CM

## 2019-12-02 RX ORDER — METFORMIN HYDROCHLORIDE 500 MG/1
500 TABLET, EXTENDED RELEASE ORAL 2 TIMES DAILY
Qty: 180 TABLET | Refills: 0 | Status: SHIPPED | OUTPATIENT
Start: 2019-12-02 | End: 2020-03-13

## 2019-12-13 ENCOUNTER — OFFICE VISIT (OUTPATIENT)
Dept: PRIMARY CARE CLINIC | Age: 69
End: 2019-12-13
Payer: MEDICARE

## 2019-12-13 DIAGNOSIS — Z23 NEEDS FLU SHOT: Primary | ICD-10-CM

## 2019-12-13 PROCEDURE — 90653 IIV ADJUVANT VACCINE IM: CPT | Performed by: NURSE PRACTITIONER

## 2019-12-13 PROCEDURE — G0008 ADMIN INFLUENZA VIRUS VAC: HCPCS | Performed by: NURSE PRACTITIONER

## 2019-12-16 ENCOUNTER — OFFICE VISIT (OUTPATIENT)
Dept: PRIMARY CARE CLINIC | Age: 69
End: 2019-12-16
Payer: MEDICARE

## 2019-12-16 VITALS
DIASTOLIC BLOOD PRESSURE: 82 MMHG | BODY MASS INDEX: 33.86 KG/M2 | TEMPERATURE: 97.8 F | SYSTOLIC BLOOD PRESSURE: 124 MMHG | HEART RATE: 73 BPM | WEIGHT: 184 LBS | OXYGEN SATURATION: 98 % | HEIGHT: 62 IN

## 2019-12-16 DIAGNOSIS — E11.9 TYPE 2 DIABETES MELLITUS WITHOUT COMPLICATION, WITHOUT LONG-TERM CURRENT USE OF INSULIN (HCC): Primary | ICD-10-CM

## 2019-12-16 DIAGNOSIS — E78.2 MIXED HYPERLIPIDEMIA: ICD-10-CM

## 2019-12-16 PROCEDURE — 3046F HEMOGLOBIN A1C LEVEL >9.0%: CPT | Performed by: NURSE PRACTITIONER

## 2019-12-16 PROCEDURE — G8427 DOCREV CUR MEDS BY ELIG CLIN: HCPCS | Performed by: NURSE PRACTITIONER

## 2019-12-16 PROCEDURE — 1090F PRES/ABSN URINE INCON ASSESS: CPT | Performed by: NURSE PRACTITIONER

## 2019-12-16 PROCEDURE — G8417 CALC BMI ABV UP PARAM F/U: HCPCS | Performed by: NURSE PRACTITIONER

## 2019-12-16 PROCEDURE — G8598 ASA/ANTIPLAT THER USED: HCPCS | Performed by: NURSE PRACTITIONER

## 2019-12-16 PROCEDURE — 1036F TOBACCO NON-USER: CPT | Performed by: NURSE PRACTITIONER

## 2019-12-16 PROCEDURE — 3017F COLORECTAL CA SCREEN DOC REV: CPT | Performed by: NURSE PRACTITIONER

## 2019-12-16 PROCEDURE — G8399 PT W/DXA RESULTS DOCUMENT: HCPCS | Performed by: NURSE PRACTITIONER

## 2019-12-16 PROCEDURE — 2022F DILAT RTA XM EVC RTNOPTHY: CPT | Performed by: NURSE PRACTITIONER

## 2019-12-16 PROCEDURE — 99214 OFFICE O/P EST MOD 30 MIN: CPT | Performed by: NURSE PRACTITIONER

## 2019-12-16 PROCEDURE — 1123F ACP DISCUSS/DSCN MKR DOCD: CPT | Performed by: NURSE PRACTITIONER

## 2019-12-16 PROCEDURE — G8482 FLU IMMUNIZE ORDER/ADMIN: HCPCS | Performed by: NURSE PRACTITIONER

## 2019-12-16 PROCEDURE — 4040F PNEUMOC VAC/ADMIN/RCVD: CPT | Performed by: NURSE PRACTITIONER

## 2019-12-16 ASSESSMENT — ENCOUNTER SYMPTOMS
NAUSEA: 0
VOICE CHANGE: 0
PHOTOPHOBIA: 0
COLOR CHANGE: 0
COUGH: 0
SHORTNESS OF BREATH: 0
BACK PAIN: 0
RHINORRHEA: 0
VOMITING: 0

## 2019-12-20 DIAGNOSIS — E11.9 TYPE 2 DIABETES MELLITUS WITHOUT COMPLICATION, WITHOUT LONG-TERM CURRENT USE OF INSULIN (HCC): ICD-10-CM

## 2019-12-20 LAB
ALBUMIN SERPL-MCNC: 4 G/DL (ref 3.5–5.2)
ALP BLD-CCNC: 70 U/L (ref 35–104)
ALT SERPL-CCNC: 18 U/L (ref 5–33)
ANION GAP SERPL CALCULATED.3IONS-SCNC: 14 MMOL/L (ref 7–19)
AST SERPL-CCNC: 21 U/L (ref 5–32)
BASOPHILS ABSOLUTE: 0.1 K/UL (ref 0–0.2)
BASOPHILS RELATIVE PERCENT: 1.2 % (ref 0–1)
BILIRUB SERPL-MCNC: 0.5 MG/DL (ref 0.2–1.2)
BUN BLDV-MCNC: 19 MG/DL (ref 8–23)
CALCIUM SERPL-MCNC: 9.6 MG/DL (ref 8.8–10.2)
CHLORIDE BLD-SCNC: 103 MMOL/L (ref 98–111)
CHOLESTEROL, TOTAL: 147 MG/DL (ref 160–199)
CO2: 28 MMOL/L (ref 22–29)
CREAT SERPL-MCNC: 0.8 MG/DL (ref 0.5–0.9)
EOSINOPHILS ABSOLUTE: 0.3 K/UL (ref 0–0.6)
EOSINOPHILS RELATIVE PERCENT: 4.6 % (ref 0–5)
GFR NON-AFRICAN AMERICAN: >60
GLUCOSE BLD-MCNC: 95 MG/DL (ref 74–109)
HBA1C MFR BLD: 5.8 % (ref 4–6)
HCT VFR BLD CALC: 40.2 % (ref 37–47)
HDLC SERPL-MCNC: 42 MG/DL (ref 65–121)
HEMOGLOBIN: 12.7 G/DL (ref 12–16)
IMMATURE GRANULOCYTES #: 0 K/UL
LDL CHOLESTEROL CALCULATED: 75 MG/DL
LYMPHOCYTES ABSOLUTE: 1.9 K/UL (ref 1.1–4.5)
LYMPHOCYTES RELATIVE PERCENT: 30.7 % (ref 20–40)
MCH RBC QN AUTO: 27.9 PG (ref 27–31)
MCHC RBC AUTO-ENTMCNC: 31.6 G/DL (ref 33–37)
MCV RBC AUTO: 88.2 FL (ref 81–99)
MONOCYTES ABSOLUTE: 0.5 K/UL (ref 0–0.9)
MONOCYTES RELATIVE PERCENT: 8.8 % (ref 0–10)
NEUTROPHILS ABSOLUTE: 3.3 K/UL (ref 1.5–7.5)
NEUTROPHILS RELATIVE PERCENT: 54.5 % (ref 50–65)
PDW BLD-RTO: 13.3 % (ref 11.5–14.5)
PLATELET # BLD: 255 K/UL (ref 130–400)
PMV BLD AUTO: 9.2 FL (ref 9.4–12.3)
POTASSIUM SERPL-SCNC: 4.3 MMOL/L (ref 3.5–5)
RBC # BLD: 4.56 M/UL (ref 4.2–5.4)
SODIUM BLD-SCNC: 145 MMOL/L (ref 136–145)
TOTAL PROTEIN: 7 G/DL (ref 6.6–8.7)
TRIGL SERPL-MCNC: 151 MG/DL (ref 0–149)
TSH SERPL DL<=0.05 MIU/L-ACNC: 3.93 UIU/ML (ref 0.27–4.2)
WBC # BLD: 6.1 K/UL (ref 4.8–10.8)

## 2020-01-06 ENCOUNTER — OFFICE VISIT (OUTPATIENT)
Dept: PRIMARY CARE CLINIC | Age: 70
End: 2020-01-06
Payer: COMMERCIAL

## 2020-01-06 VITALS
DIASTOLIC BLOOD PRESSURE: 70 MMHG | SYSTOLIC BLOOD PRESSURE: 132 MMHG | OXYGEN SATURATION: 97 % | HEIGHT: 62 IN | BODY MASS INDEX: 34.23 KG/M2 | HEART RATE: 74 BPM | TEMPERATURE: 97.7 F | WEIGHT: 186 LBS

## 2020-01-06 PROCEDURE — G0438 PPPS, INITIAL VISIT: HCPCS | Performed by: NURSE PRACTITIONER

## 2020-01-06 PROCEDURE — 90732 PPSV23 VACC 2 YRS+ SUBQ/IM: CPT | Performed by: NURSE PRACTITIONER

## 2020-01-06 PROCEDURE — 99213 OFFICE O/P EST LOW 20 MIN: CPT | Performed by: NURSE PRACTITIONER

## 2020-01-06 PROCEDURE — G0009 ADMIN PNEUMOCOCCAL VACCINE: HCPCS | Performed by: NURSE PRACTITIONER

## 2020-01-06 ASSESSMENT — PATIENT HEALTH QUESTIONNAIRE - PHQ9
SUM OF ALL RESPONSES TO PHQ QUESTIONS 1-9: 0
SUM OF ALL RESPONSES TO PHQ QUESTIONS 1-9: 0

## 2020-01-06 ASSESSMENT — LIFESTYLE VARIABLES: HOW OFTEN DO YOU HAVE A DRINK CONTAINING ALCOHOL: 0

## 2020-01-15 ENCOUNTER — TELEPHONE (OUTPATIENT)
Dept: CARDIOLOGY | Age: 70
End: 2020-01-15

## 2020-06-16 RX ORDER — METFORMIN HYDROCHLORIDE 500 MG/1
TABLET, EXTENDED RELEASE ORAL
Qty: 180 TABLET | Refills: 0 | Status: SHIPPED | OUTPATIENT
Start: 2020-06-16 | End: 2020-09-16

## 2020-06-17 ENCOUNTER — TELEPHONE (OUTPATIENT)
Dept: PRIMARY CARE CLINIC | Age: 70
End: 2020-06-17

## 2020-06-17 NOTE — TELEPHONE ENCOUNTER
Patient called and reports that [facility] called her and let her know that her metformin was recalled and she needed to get a different medicine  Called [facility] and the brand she had was recalled.  They have her script ready to be filled with other brand  Patient notified

## 2020-06-18 RX ORDER — ATORVASTATIN CALCIUM 40 MG/1
40 TABLET, FILM COATED ORAL DAILY
Qty: 30 TABLET | Refills: 11 | Status: SHIPPED | OUTPATIENT
Start: 2020-06-18 | End: 2021-07-08

## 2020-06-18 NOTE — TELEPHONE ENCOUNTER
Sierra Stewartjordania called to request a refill on her medication.       Last office visit : 1/6/2020   Next office visit : 7/6/2020     Requested Prescriptions     Pending Prescriptions Disp Refills    atorvastatin (LIPITOR) 40 MG tablet 30 tablet 11     Sig: Take 1 tablet by mouth daily            Yetta Bosworth

## 2020-07-06 ENCOUNTER — VIRTUAL VISIT (OUTPATIENT)
Dept: PRIMARY CARE CLINIC | Age: 70
End: 2020-07-06
Payer: COMMERCIAL

## 2020-07-06 PROCEDURE — 99443 PR PHYS/QHP TELEPHONE EVALUATION 21-30 MIN: CPT | Performed by: NURSE PRACTITIONER

## 2020-07-06 RX ORDER — TRIAMCINOLONE ACETONIDE 0.25 MG/G
CREAM TOPICAL
Qty: 1 TUBE | Refills: 0 | Status: SHIPPED | OUTPATIENT
Start: 2020-07-06 | End: 2022-11-01

## 2020-07-06 ASSESSMENT — ENCOUNTER SYMPTOMS
COLOR CHANGE: 0
NAUSEA: 0
SHORTNESS OF BREATH: 0
COUGH: 0
RHINORRHEA: 0
VOICE CHANGE: 0
BACK PAIN: 0
PHOTOPHOBIA: 0
VOMITING: 0

## 2020-07-06 NOTE — PROGRESS NOTES
[facility] [place] [address]            Phone:  [phone]  Fax:  [phone]    Aurelia Shahid is a 69 y.o. female evaluated via telephone on 7/6/2020. Consent:    She and/or health care decision maker is aware that that she may receive a bill for this telephone service, depending on her insurance coverage, and has provided verbal consent to proceed: Yes      HPI  Chief Complaint   Patient presents with    Diabetes    Hypertension    Rash       I communicated with the patient and/or health care decision maker about:    Patient reports she recently got into poison ivy and has a rash. She has been using rubbing alcohol. Treatment Adherence:   Medication compliance:  compliant most of the time  Diet compliance:  compliant most of the time  Weight trend: stable  Current exercise: no regular exercise  Barriers: none    Diabetes Mellitus Type 2: Current symptoms/problems include none. Home blood sugar records: fasting range:   Any episodes of hypoglycemia? no  Eye exam current (within one year): no  Tobacco history: She  reports that she has never smoked. She has never used smokeless tobacco.   Daily Aspirin? Yes    Hypertension:  Home blood pressure monitoring: No.  She is adherent to a low sodium diet. Patient denies chest pain, shortness of breath and headache. Antihypertensive medication side effects: no medication side effects noted. Use of agents associated with hypertension: none. Hyperlipidemia:  No new myalgias or GI upset on atorvastatin (Lipitor).        Lab Results   Component Value Date    LABA1C 5.8 12/20/2019    LABA1C 5.7 01/15/2018    LABA1C 5.9 06/08/2017     Lab Results   Component Value Date    LABMICR <1.20 05/08/2019    CREATININE 0.8 12/20/2019     Lab Results   Component Value Date    ALT 18 12/20/2019    AST 21 12/20/2019     Lab Results   Component Value Date    CHOL 147 (L) 12/20/2019    TRIG 151 (H) 12/20/2019    HDL 42 (L) 12/20/2019 National Emergencies Act, [address] [facility] waiver authority and the [employer] Act, this Virtual  Visit was conducted, with patient's consent, to reduce the patient's risk of exposure to COVID-19 and provide continuity of care for an established patient.

## 2020-07-30 ENCOUNTER — OFFICE VISIT (OUTPATIENT)
Dept: CARDIOLOGY | Age: 70
End: 2020-07-30
Payer: MEDICARE

## 2020-07-30 VITALS
SYSTOLIC BLOOD PRESSURE: 150 MMHG | HEART RATE: 75 BPM | BODY MASS INDEX: 34.78 KG/M2 | HEIGHT: 62 IN | DIASTOLIC BLOOD PRESSURE: 88 MMHG | WEIGHT: 189 LBS

## 2020-07-30 DIAGNOSIS — Z95.2 H/O AORTIC VALVE REPLACEMENT: ICD-10-CM

## 2020-07-30 LAB
ALBUMIN SERPL-MCNC: 4.2 G/DL (ref 3.5–5.2)
ALP BLD-CCNC: 77 U/L (ref 35–104)
ALT SERPL-CCNC: 20 U/L (ref 5–33)
ANION GAP SERPL CALCULATED.3IONS-SCNC: 12 MMOL/L (ref 7–19)
AST SERPL-CCNC: 21 U/L (ref 5–32)
BASOPHILS ABSOLUTE: 0.1 K/UL (ref 0–0.2)
BASOPHILS RELATIVE PERCENT: 1 % (ref 0–1)
BILIRUB SERPL-MCNC: 0.4 MG/DL (ref 0.2–1.2)
BUN BLDV-MCNC: 18 MG/DL (ref 8–23)
CALCIUM SERPL-MCNC: 10 MG/DL (ref 8.8–10.2)
CHLORIDE BLD-SCNC: 103 MMOL/L (ref 98–111)
CHOLESTEROL, TOTAL: 167 MG/DL (ref 160–199)
CO2: 29 MMOL/L (ref 22–29)
CREAT SERPL-MCNC: 0.7 MG/DL (ref 0.5–0.9)
EOSINOPHILS ABSOLUTE: 0.3 K/UL (ref 0–0.6)
EOSINOPHILS RELATIVE PERCENT: 3.3 % (ref 0–5)
GFR AFRICAN AMERICAN: >59
GFR NON-AFRICAN AMERICAN: >60
GLUCOSE BLD-MCNC: 91 MG/DL (ref 74–109)
HBA1C MFR BLD: 5.9 % (ref 4–6)
HCT VFR BLD CALC: 40.9 % (ref 37–47)
HDLC SERPL-MCNC: 43 MG/DL (ref 65–121)
HEMOGLOBIN: 13.4 G/DL (ref 12–16)
IMMATURE GRANULOCYTES #: 0 K/UL
LDL CHOLESTEROL CALCULATED: 87 MG/DL
LYMPHOCYTES ABSOLUTE: 1.7 K/UL (ref 1.1–4.5)
LYMPHOCYTES RELATIVE PERCENT: 22.1 % (ref 20–40)
MCH RBC QN AUTO: 28.6 PG (ref 27–31)
MCHC RBC AUTO-ENTMCNC: 32.8 G/DL (ref 33–37)
MCV RBC AUTO: 87.2 FL (ref 81–99)
MONOCYTES ABSOLUTE: 0.5 K/UL (ref 0–0.9)
MONOCYTES RELATIVE PERCENT: 6.9 % (ref 0–10)
NEUTROPHILS ABSOLUTE: 5.2 K/UL (ref 1.5–7.5)
NEUTROPHILS RELATIVE PERCENT: 66.4 % (ref 50–65)
PDW BLD-RTO: 13.2 % (ref 11.5–14.5)
PLATELET # BLD: 276 K/UL (ref 130–400)
PMV BLD AUTO: 9.1 FL (ref 9.4–12.3)
POTASSIUM SERPL-SCNC: 4.2 MMOL/L (ref 3.5–5)
RBC # BLD: 4.69 M/UL (ref 4.2–5.4)
SODIUM BLD-SCNC: 144 MMOL/L (ref 136–145)
TOTAL PROTEIN: 7.1 G/DL (ref 6.6–8.7)
TRIGL SERPL-MCNC: 186 MG/DL (ref 0–149)
TSH SERPL DL<=0.05 MIU/L-ACNC: 2.47 UIU/ML (ref 0.27–4.2)
WBC # BLD: 7.9 K/UL (ref 4.8–10.8)

## 2020-07-30 PROCEDURE — 93000 ELECTROCARDIOGRAM COMPLETE: CPT | Performed by: CLINICAL NURSE SPECIALIST

## 2020-07-30 PROCEDURE — 99213 OFFICE O/P EST LOW 20 MIN: CPT | Performed by: CLINICAL NURSE SPECIALIST

## 2020-07-30 RX ORDER — METOPROLOL SUCCINATE 25 MG/1
25 TABLET, EXTENDED RELEASE ORAL DAILY
Qty: 30 TABLET | Refills: 5 | Status: SHIPPED | OUTPATIENT
Start: 2020-07-30 | End: 2021-04-06 | Stop reason: SDUPTHER

## 2020-07-30 ASSESSMENT — ENCOUNTER SYMPTOMS
ABDOMINAL PAIN: 0
WHEEZING: 0
CHEST TIGHTNESS: 0
SHORTNESS OF BREATH: 0
COUGH: 0
EYE REDNESS: 0
NAUSEA: 0
ROS SKIN COMMENTS: C/O ITCHING
FACIAL SWELLING: 0
VOMITING: 0

## 2020-07-30 NOTE — PATIENT INSTRUCTIONS
Return in about 1 month (around 8/30/2020) for Dr. Estrella Shin. Add Metoprolol XL 25mg daily    Call with any questionsor concerns  Follow up with LULA Stewart for non cardiac problems  Report any new problems  Cardiovascular Fitness-Exercise as tolerated. Strive for 15 minutes of exercise most days of the week. Cardiac / HealthyDiet  Continue current medications as directed  Continue plan of treatment  It is always recommended that you bring your medicationsbottles with you to each visit - this is for your safety!

## 2020-07-30 NOTE — PROGRESS NOTES
[facility], [address] [place] [address], [id number]  Phone: [phone]  Fax: [phone]    OFFICE VISIT:  2020    Rojelio Lafleur - : 1950    Reason For Visit:  Rusty Robles is a 69 y.o. female who is here for Coronary Artery Disease (No cardiac sx today. ); Hyperlipidemia; and Hypertension    HPI  Patient is here for follow-up with a history of valvular heart disease, status post bioprosthetic aortic valve replacement , moderate, nonobstructive CAD, hypertension, hyperlipidemia. She denies chest pain, dyspnea, orthopnea, PND, edema. Her blood pressure has been uncontrolled recently and she notices some brief lettering at night. A nurse from her insurance company came out for an evaluation and noticed a heart murmur and patient states she is concerned    Cammy Forman, LULA is PCP.   Rojelio Lafleur has the following history as recorded in [facility]:    Patient Active Problem List    Diagnosis Date Noted    Valvular heart disease 2019    Arteriosclerotic heart disease (ASHD)     Palpitations     Mild mitral regurgitation 2016    Mild tricuspid regurgitation 2016    Postoperative atrial fibrillation ([address] 2016    S/P AVR     Coronary artery disease involving native coronary artery of native heart without angina pectoris     Essential hypertension     Type 2 diabetes mellitus without complication ([address]     Chest pain 2014    SIGALA (dyspnea on exertion) 2014    Family history of heart disease     Murmur     Family history of heart disease     Hyperlipidemia      Past Medical History:   Diagnosis Date    Arteriosclerotic heart disease (ASHD)     Coronary artery disease involving native coronary artery of native heart without angina pectoris     Family history of heart disease     father  37 MI, brother 46  MI, brother 61  MI, sister 58  MI    Hyperlipidemia     Hypertension     Mild mitral regurgitation 05/11/2016    Mild tricuspid regurgitation 05/11/2016    Moderate aortic stenosis 05/11/2016    s/p AVR, Hx: aortic valve replacement 3/29/16.      Murmur     Type 2 diabetes mellitus without complication (HCC)      Past Surgical History:   Procedure Laterality Date    AORTIC VALVE REPAIR N/A 3/29/2016    21-mm [employer] bovine pericardial valve, model No. 3300TFX, serial No. [id number], Dr. Aaron Macedo      X 3 miscarriages     Family History   Problem Relation Age of Onset    Diabetes Mother     Other Father         thickness in blood    Heart Disease Father     Other Sister         thickness in blood    Diabetes Sister     Other Brother         thickness in blood    Other Paternal Uncle         thickness in blood    Other Paternal Grandfather         thickness in blood    Heart Disease Paternal Grandfather     Other Brother         thickness in blood    Diabetes Brother     Heart Disease Brother     Other Brother         thickness in blood    Other Brother         thickness in blood    Colon Cancer Neg Hx     Colon Polyps Neg Hx      Social History     Tobacco Use    Smoking status: Never Smoker    Smokeless tobacco: Never Used   Substance Use Topics    Alcohol use: No      Current Outpatient Medications   Medication Sig Dispense Refill    metoprolol succinate (TOPROL XL) 25 MG extended release tablet Take 1 tablet by mouth daily 30 tablet 5    triamcinolone (KENALOG) 0.025 % cream Apply Topically 1 Tube 0    atorvastatin (LIPITOR) 40 MG tablet Take 1 tablet by mouth daily 30 tablet 11    metFORMIN (GLUCOPHAGE-XR) 500 MG extended release tablet Take 1 tablet by mouth twice daily 180 tablet 0    lisinopril (PRINIVIL;ZESTRIL) 20 MG tablet Take 1 tablet by mouth daily 90 tablet 3    Lancets MISC 1 each by Does not apply route daily Dx:e11.9 100 each 5    blood glucose test strips (ASCENSIA AUTODISC VI;ONE TOUCH ULTRA TEST VI) strip 1 each by In Vitro route daily As needed. Dx.e11.9 100 each 5    Multiple Vitamins-Minerals (THERAPEUTIC MULTIVITAMIN-MINERALS) tablet Take 1 tablet by mouth daily      aspirin 81 MG tablet Take 81 mg by mouth nightly        No current facility-administered medications for this visit. Allergies: Corticosteroids and Niacin and related    Review of Systems  Review of Systems   Constitutional: Negative for activity change, diaphoresis, fatigue, fever and unexpected weight change. HENT: Negative for facial swelling and nosebleeds. Eyes: Negative for redness and visual disturbance. Respiratory: Negative for cough, chest tightness, shortness of breath and wheezing. Cardiovascular: Negative for chest pain, palpitations and leg swelling. Gastrointestinal: Negative for abdominal pain, nausea and vomiting. Endocrine: Negative for cold intolerance and heat intolerance. Genitourinary: Negative for dysuria and hematuria. Musculoskeletal: Negative for arthralgias and myalgias. Skin: Negative for pallor and rash. Neurological: Positive for dizziness. Negative for seizures, syncope, weakness and light-headedness. Hematological: Does not bruise/bleed easily. Psychiatric/Behavioral: Negative for agitation. The patient is not nervous/anxious. Objective  Vital Signs - BP (!) 150/88   Pulse 75   Ht 5' 2\" (1.575 m)   Wt 189 lb (85.7 kg)   Breastfeeding No   BMI 34.57 kg/m²      BP Readings from Last 3 Encounters:   07/30/20 (!) 150/88   01/06/20 132/70   12/16/19 124/82    Pulse Readings from Last 3 Encounters:   07/30/20 75   01/06/20 74   12/16/19 73        Physical Exam   Constitutional: She is oriented to person, place, and time. She appears well-developed and well-nourished. No distress. HENT:   Head: Normocephalic and atraumatic.    Right Ear: Hearing and external ear normal.   Left Ear: Hearing and external ear normal.   Nose: Nose normal.   Eyes: Pupils are equal, round, and replacement 2016. No change in symptoms    Hypertension/ dizziness-related to position change. Plan will be to stop lisinopril HCTZ and start plain lisinopril 20 mg daily    Hyperlipidemia-patient states she has orders to have her cholesterol checked for her PCP but has not yet done so. Encouraged her to do so soon. Last lipids were not at goal. We discussed LDL cholesterol goal of less than 70 with her history of heart disease. Triglycerides are also significantly elevated per lab last year- we discussed dietary modification    Stable cardiovascular status. No evidence of overt heart failure,angina or dysrhythmia. Plan      Return in about 1 month (around 2020) for Dr. Jessica Cates. Stop Lisinopril HCTZ  Start Lisinopril 20mg daily  Check cholesterol fasting as ordered by your PCP  LDL cholesterol ( bad cholesterol) goal is less than 70 with a history of heart disease. Limit starchy and sweet food to lower triglycerides      Call with any questionsor concerns  Follow up with LULA Garcia for non cardiac problems  Report any new problems  Cardiovascular Fitness-Exercise as tolerated. Strive for 15 minutes of exercise most days of the week. Cardiac / HealthyDiet  Continue current medications as directed  Continue plan of treatment  It is always recommended that you bring your medicationsbottles with you to each visit - this is for your safety! LULA Santoro  [facility] [place] [address], [address] [address]  Phone: [phone]  Fax: [phone]    OFFICE VISIT:  2020    Elin Nicholson - : 1950    Reason For Visit:  Mackenzie Benitez is a 69 y.o. female who is here for Coronary Artery Disease (No cardiac sx today. ); Hyperlipidemia; and Hypertension    HPI  Patient is here for follow-up with a history of CAD, valvular heart disease, status post bioprosthetic aortic valve replacement 2016, hypertension, hyperlipidemia.   She is complaining of dizziness with position change. She denies chest pain, dyspnea, orthopnea, PND, edema, palpitations     Poison LULA Boudreaux is PCP. Elin Nicholson has the following history as recorded in [facility]:    Patient Active Problem List    Diagnosis Date Noted    Valvular heart disease 2019    Arteriosclerotic heart disease (ASHD)     Palpitations     Mild mitral regurgitation 2016    Mild tricuspid regurgitation 2016    Postoperative atrial fibrillation ([facility] 2016    S/P AVR     Coronary artery disease involving native coronary artery of native heart without angina pectoris     Essential hypertension     Type 2 diabetes mellitus without complication ([facility]     Chest pain 2014    SIGALA (dyspnea on exertion) 2014    Family history of heart disease     Murmur     Family history of heart disease     Hyperlipidemia      Past Medical History:   Diagnosis Date    Arteriosclerotic heart disease (ASHD)     Coronary artery disease involving native coronary artery of native heart without angina pectoris     Family history of heart disease     father  37 MI, brother 46  MI, brother 61  MI, sister 58  MI    Hyperlipidemia     Hypertension     Mild mitral regurgitation 2016    Mild tricuspid regurgitation 2016    Moderate aortic stenosis 2016    s/p AVR, Hx: aortic valve replacement 3/29/16.      Murmur     Type 2 diabetes mellitus without complication [facility])      Past Surgical History:   Procedure Laterality Date    AORTIC VALVE REPAIR N/A 3/29/2016    21-mm [employer] bovine pericardial valve, model No. 3300TFX, serial No. [id number], Dr. Stephanie Driscoll      X 3 miscarriages     Family History   Problem Relation Age of Onset    Diabetes Mother     Other Father         thickness in blood    Heart Disease Father     Other Sister         thickness in blood    Diabetes Sister  Other Brother         thickness in blood    Other Paternal Uncle         thickness in blood    Other Paternal Grandfather         thickness in blood    Heart Disease Paternal Grandfather     Other Brother         thickness in blood    Diabetes Brother     Heart Disease Brother     Other Brother         thickness in blood    Other Brother         thickness in blood    Colon Cancer Neg Hx     Colon Polyps Neg Hx      Social History     Tobacco Use    Smoking status: Never Smoker    Smokeless tobacco: Never Used   Substance Use Topics    Alcohol use: No      Current Outpatient Medications   Medication Sig Dispense Refill    metoprolol succinate (TOPROL XL) 25 MG extended release tablet Take 1 tablet by mouth daily 30 tablet 5    triamcinolone (KENALOG) 0.025 % cream Apply Topically 1 Tube 0    atorvastatin (LIPITOR) 40 MG tablet Take 1 tablet by mouth daily 30 tablet 11    metFORMIN (GLUCOPHAGE-XR) 500 MG extended release tablet Take 1 tablet by mouth twice daily 180 tablet 0    lisinopril (PRINIVIL;ZESTRIL) 20 MG tablet Take 1 tablet by mouth daily 90 tablet 3    Lancets MISC 1 each by Does not apply route daily Dx:e11.9 100 each 5    blood glucose test strips (ASCENSIA AUTODISC VI;ONE TOUCH ULTRA TEST VI) strip 1 each by In Vitro route daily As needed. Dx.e11.9 100 each 5    Multiple Vitamins-Minerals (THERAPEUTIC MULTIVITAMIN-MINERALS) tablet Take 1 tablet by mouth daily      aspirin 81 MG tablet Take 81 mg by mouth nightly        No current facility-administered medications for this visit. Allergies: Corticosteroids and Niacin and related    Review of Systems  Review of Systems   Constitutional: Negative for activity change, diaphoresis, fatigue, fever and unexpected weight change. HENT: Negative for facial swelling and nosebleeds. Eyes: Negative for redness and visual disturbance. Respiratory: Negative for cough, chest tightness, shortness of breath and wheezing. Cardiovascular: Positive for palpitations (brief fluttering at night). Negative for chest pain and leg swelling. Gastrointestinal: Negative for abdominal pain, nausea and vomiting. Endocrine: Negative for cold intolerance and heat intolerance. Genitourinary: Negative for dysuria and hematuria. Musculoskeletal: Negative for arthralgias and myalgias. Skin: Negative for pallor and rash. C/o itching   Neurological: Negative for dizziness, seizures, syncope, weakness and light-headedness. Hematological: Does not bruise/bleed easily. Psychiatric/Behavioral: Negative for agitation. The patient is not nervous/anxious. Objective  Vital Signs - BP (!) 150/88   Pulse 75   Ht 5' 2\" (1.575 m)   Wt 189 lb (85.7 kg)   Breastfeeding No   BMI 34.57 kg/m²      BP Readings from Last 3 Encounters:   07/30/20 (!) 150/88   01/06/20 132/70   12/16/19 124/82    Pulse Readings from Last 3 Encounters:   07/30/20 75   01/06/20 74   12/16/19 73          BP Readings from Last 3 Encounters:   07/30/20 (!) 150/88   01/06/20 132/70   12/16/19 124/82    Pulse Readings from Last 3 Encounters:   07/30/20 75   01/06/20 74   12/16/19 73        Physical Exam  Vitals signs and nursing note reviewed. Constitutional:       General: She is not in acute distress. Appearance: She is well-developed. She is obese. HENT:      Head: Normocephalic and atraumatic. Right Ear: Hearing and external ear normal.      Left Ear: Hearing and external ear normal.      Nose: Nose normal.   Eyes:      General:         Right eye: No discharge. Left eye: No discharge. Pupils: Pupils are equal, round, and reactive to light. Neck:      Musculoskeletal: Neck supple. No muscular tenderness. Thyroid: No thyromegaly. Vascular: No carotid bruit or JVD. Trachea: No tracheal deviation. Cardiovascular:      Rate and Rhythm: Normal rate and regular rhythm.       Heart sounds: Murmur (2/6 systolic murmur) present. No friction rub. No gallop. Comments: No carotid bruit  Pulmonary:      Effort: Pulmonary effort is normal. No respiratory distress. Breath sounds: Normal breath sounds. No wheezing or rales. Abdominal:      Palpations: Abdomen is soft. Tenderness: There is no abdominal tenderness. Musculoskeletal:         General: No swelling or deformity. Skin:     General: Skin is warm and dry. Findings: No rash. Neurological:      Mental Status: She is alert and oriented to person, place, and time. Cranial Nerves: No cranial nerve deficit. Psychiatric:         Behavior: Behavior normal.         Judgment: Judgment normal.         Data:  Echo 9/17  Summary   Mitral valve leaflets are mildly thickened with preserved leaflet   mobility.   Mild mitral regurgitation is present.   Normal functioning bioprosthetic valve in the aortic position.   No evidence of aortic valve regurgitation .   Left ventricular size is normal .   Normal left ventricular wall thickness.   Left ventricular ejection fraction is visually estimated at 60%.  E/A flow reversal noted. Suggestive of diastolic dysfunction. Lab Results   Component Value Date    CHOL 167 07/30/2020    TRIG 186 (H) 07/30/2020    HDL 43 (L) 07/30/2020    LDLCALC 87 07/30/2020    LDLDIRECT 111 08/10/2015     Lab Results   Component Value Date    ALT 20 07/30/2020    AST 21 07/30/2020     Heart Cath 3/22/16  Conclusions    1. Moderate non-occlusive CAD of the LAD   2. Aortic stenosis, Mean gradient via cath on pullback 29mmHg    Recommendations    1. Routine post cath care   2. Ct surgery consult for AVR    Assessment:     Diagnosis Orders   1. Valvular heart disease     2. S/P AVR     3. Coronary artery disease involving native coronary artery of native heart without angina pectoris     4. Essential hypertension  EKG 12 lead   5. Mixed hyperlipidemia       Valvular heart disease-status post bioprosthetic aortic valve replacement 2016. Reviewed most recent echo from 2017. No change in symptoms    CAD- moderate nonocclusive disease per heart cath in 2016. No angina symptoms    Hypertension-uncontrolled. Will add metoprolol XL 25 mg daily. She is also having some mild fluttering at night which may be helped by metoprolol as well    Hyperlipidemia- on statin therapy, managed by PCP    Plan      Return in about 1 month (around 8/30/2020) for Dr. Neftali Lopez. Establish care  Add Metoprolol XL 25mg daily    Call with any questionsor concerns  Follow up with LULA Linn for non cardiac problems  Report any new problems  Cardiovascular Fitness-Exercise as tolerated. Strive for 15 minutes of exercise most days of the week. Cardiac / HealthyDiet  Continue current medications as directed  Continue plan of treatment  It is always recommended that you bring your medicationsbottles with you to each visit - this is for your safety!        Naomia Carrel, APRN

## 2020-09-14 ENCOUNTER — TELEPHONE (OUTPATIENT)
Dept: CARDIOLOGY | Age: 70
End: 2020-09-14

## 2020-09-14 NOTE — TELEPHONE ENCOUNTER
Called and unable to leave v/m for Pt. Burnice Covert is changing clinic days from Monday to Tuesday.  Pt can change to different time frame but needs to be on Tuesday

## 2020-09-15 NOTE — TELEPHONE ENCOUNTER
Called and unable to leave v/m for Pt. Halima Colmenares is changing clinic days from Monday to Tuesday.  Pt can change to different time frame but needs to be on Tuesday

## 2020-09-16 RX ORDER — LISINOPRIL 20 MG/1
TABLET ORAL
Qty: 90 TABLET | Refills: 1 | Status: SHIPPED | OUTPATIENT
Start: 2020-09-16 | End: 2021-04-06 | Stop reason: SDUPTHER

## 2020-10-13 ENCOUNTER — OFFICE VISIT (OUTPATIENT)
Dept: CARDIOLOGY | Age: 70
End: 2020-10-13
Payer: MEDICARE

## 2020-10-13 VITALS
WEIGHT: 170 LBS | SYSTOLIC BLOOD PRESSURE: 150 MMHG | DIASTOLIC BLOOD PRESSURE: 82 MMHG | HEART RATE: 74 BPM | BODY MASS INDEX: 31.28 KG/M2 | HEIGHT: 62 IN

## 2020-10-13 PROCEDURE — 99204 OFFICE O/P NEW MOD 45 MIN: CPT | Performed by: INTERNAL MEDICINE

## 2020-10-13 NOTE — PROGRESS NOTES
 Mild mitral regurgitation 05/11/2016    Mild tricuspid regurgitation 05/11/2016    Moderate aortic stenosis 05/11/2016    s/p AVR, Hx: aortic valve replacement 3/29/16.  Murmur     Type 2 diabetes mellitus without complication (HCC)      Past Surgical History:   Procedure Laterality Date    AORTIC VALVE REPAIR N/A 3/29/2016    21-mm [employer] bovine pericardial valve, model No. 3300TFX, serial No. [id number], Dr. Carolina De La Garza      X 3 miscarriages     Family History   Problem Relation Age of Onset    Diabetes Mother     Other Father         thickness in blood    Heart Disease Father     Other Sister         thickness in blood    Diabetes Sister     Other Brother         thickness in blood    Other Paternal Uncle         thickness in blood    Other Paternal Grandfather         thickness in blood    Heart Disease Paternal Grandfather     Other Brother         thickness in blood    Diabetes Brother     Heart Disease Brother     Other Brother         thickness in blood    Other Brother         thickness in blood    Colon Cancer Neg Hx     Colon Polyps Neg Hx      Social History     Tobacco Use    Smoking status: Never Smoker    Smokeless tobacco: Never Used   Substance Use Topics    Alcohol use: No      Current Outpatient Medications   Medication Sig Dispense Refill    lisinopril (PRINIVIL;ZESTRIL) 20 MG tablet Take 1 tablet by mouth once daily 90 tablet 1    metFORMIN (GLUCOPHAGE-XR) 500 MG extended release tablet Take 1 tablet by mouth twice daily 180 tablet 1    metoprolol succinate (TOPROL XL) 25 MG extended release tablet Take 1 tablet by mouth daily 30 tablet 5    atorvastatin (LIPITOR) 40 MG tablet Take 1 tablet by mouth daily 30 tablet 11    Lancets MISC 1 each by Does not apply route daily Dx:e11.9 100 each 5    blood glucose test strips (ASCENSIA AUTODISC VI;ONE TOUCH ULTRA TEST VI) strip 1 each by In Vitro route daily As needed. Dx.e11.9 100 each 5    Multiple Vitamins-Minerals (THERAPEUTIC MULTIVITAMIN-MINERALS) tablet Take 1 tablet by mouth daily      aspirin 81 MG tablet Take 81 mg by mouth nightly       triamcinolone (KENALOG) 0.025 % cream Apply Topically 1 Tube 0     No current facility-administered medications for this visit. Allergies: Corticosteroids and Niacin and related    Review of Systems  Review of Systems   Constitutional: Negative for activity change, diaphoresis, fatigue, fever and unexpected weight change. HENT: Negative for facial swelling and nosebleeds. Eyes: Negative for redness and visual disturbance. Respiratory: Negative for cough, chest tightness, shortness of breath and wheezing. Cardiovascular: Negative for chest pain, palpitations and leg swelling. Gastrointestinal: Negative for abdominal pain, nausea and vomiting. Endocrine: Negative for cold intolerance and heat intolerance. Genitourinary: Negative for dysuria and hematuria. Musculoskeletal: Negative for arthralgias and myalgias. Skin: Negative for pallor and rash. Neurological: Positive for dizziness. Negative for seizures, syncope, weakness and light-headedness. Hematological: Does not bruise/bleed easily. Psychiatric/Behavioral: Negative for agitation. The patient is not nervous/anxious. Objective  Vital Signs - BP (!) 150/82   Pulse 74   Ht 5' 2\" (1.575 m)   Wt 170 lb (77.1 kg)   BMI 31.09 kg/m²      BP Readings from Last 3 Encounters:   10/13/20 (!) 150/82   07/30/20 (!) 150/88   01/06/20 132/70    Pulse Readings from Last 3 Encounters:   10/13/20 74   07/30/20 75   01/06/20 74        Physical Exam   Constitutional: She is oriented to person, place, and time. She appears well-developed and well-nourished. No distress. HENT:   Head: Normocephalic and atraumatic.    Right Ear: Hearing and external ear normal.   Left Ear: Hearing and external ear normal.   Nose: Nose normal.   Eyes: Pupils are equal, round, and reactive to light. Right eye exhibits no discharge. Left eye exhibits no discharge. Neck: No JVD present. No tracheal deviation present. No thyromegaly present. Cardiovascular: Normal rate, regular rhythm and intact distal pulses. Exam reveals no gallop and no friction rub. Murmur (2/6 systolic murmur) heard. No carotid bruit   Pulmonary/Chest: Effort normal and breath sounds normal. No respiratory distress. She has no wheezes. She has no rales. Abdominal: Soft. There is no tenderness. Musculoskeletal: She exhibits no edema. Normal gait and station   Neurological: She is alert and oriented to person, place, and time. No cranial nerve deficit. Skin: Skin is warm and dry. No rash noted. Psychiatric: She has a normal mood and affect. Her behavior is normal. Judgment normal.   Nursing note and vitals reviewed. Data:  Echo 9/17  Summary   Mitral valve leaflets are mildly thickened with preserved leaflet   mobility.   Mild mitral regurgitation is present.   Normal functioning bioprosthetic valve in the aortic position.   No evidence of aortic valve regurgitation .   Left ventricular size is normal .   Normal left ventricular wall thickness.   Left ventricular ejection fraction is visually estimated at 60%.  E/A flow reversal noted. Suggestive of diastolic dysfunction. Lab Results   Component Value Date    CHOL 167 07/30/2020    TRIG 186 (H) 07/30/2020    HDL 43 (L) 07/30/2020    LDLCALC 87 07/30/2020    LDLDIRECT 111 08/10/2015     Lab Results   Component Value Date    ALT 20 07/30/2020    AST 21 07/30/2020     Assessment:    ------------------  Arteriosclerotic heart disease-stable without symptoms of angina    Valvular heart disease-status post bioprosthetic aortic valve replacement 2016. I will request 2D echo to assess her LV function and assess the mean gradient and bioprosthetic valve functioning    Hypertension/ dizziness-related to position change.   Continue with lisinopril 20 mg daily    Hyperlipidemia- continue with Lipitor 40 daily    Stable cardiovascular status. No evidence of overt heart failure,angina or dysrhythmia. Plan    Call the patient with results of the 2D echo once completed  Follow-up after 6-month    Call with any questionsor concerns  Follow up with LULA Gonzáles for non cardiac problems  Report any new problems  Cardiovascular Fitness-Exercise as tolerated. Strive for 15 minutes of exercise most days of the week. Cardiac / HealthyDiet  Continue current medications as directed  Continue plan of treatment  It is always recommended that you bring your medicationsbottles with you to each visit - this is for your safety!        Otis Sinclair MD, [facility]  Interventional Cardiologist, Endovascular Specialist   Medical Director, Structural Heart Program   [facility]

## 2020-10-29 RX ORDER — BLOOD SUGAR DIAGNOSTIC
STRIP MISCELLANEOUS
Qty: 100 EACH | Refills: 3 | Status: SHIPPED | OUTPATIENT
Start: 2020-10-29 | End: 2021-04-06 | Stop reason: CLARIF

## 2020-10-29 RX ORDER — LANCETS 30 GAUGE
1 EACH MISCELLANEOUS DAILY
Qty: 100 EACH | Refills: 3 | Status: SHIPPED | OUTPATIENT
Start: 2020-10-29

## 2020-11-03 PROBLEM — I25.10 ARTERIOSCLEROTIC HEART DISEASE (ASHD): Status: RESOLVED | Noted: 2020-11-03 | Resolved: 2020-11-03

## 2020-11-10 ENCOUNTER — HOSPITAL ENCOUNTER (OUTPATIENT)
Dept: NON INVASIVE DIAGNOSTICS | Age: 70
Discharge: HOME | End: 2020-11-10
Payer: MEDICARE

## 2020-11-10 LAB
LV EF: 58 %
LVEF MODALITY: NORMAL

## 2020-11-10 PROCEDURE — 93306 TTE W/DOPPLER COMPLETE: CPT

## 2021-01-21 ENCOUNTER — VIRTUAL VISIT (OUTPATIENT)
Dept: PRIMARY CARE CLINIC | Age: 71
End: 2021-01-21
Payer: MEDICARE

## 2021-01-21 VITALS
WEIGHT: 190 LBS | HEIGHT: 62 IN | SYSTOLIC BLOOD PRESSURE: 180 MMHG | BODY MASS INDEX: 34.96 KG/M2 | DIASTOLIC BLOOD PRESSURE: 80 MMHG | HEART RATE: 77 BPM

## 2021-01-21 DIAGNOSIS — I25.10 CORONARY ARTERY DISEASE INVOLVING NATIVE CORONARY ARTERY OF NATIVE HEART WITHOUT ANGINA PECTORIS: ICD-10-CM

## 2021-01-21 DIAGNOSIS — I10 ESSENTIAL HYPERTENSION: ICD-10-CM

## 2021-01-21 DIAGNOSIS — Z00.00 ROUTINE GENERAL MEDICAL EXAMINATION AT A HEALTH CARE FACILITY: Primary | ICD-10-CM

## 2021-01-21 DIAGNOSIS — E11.9 TYPE 2 DIABETES MELLITUS WITHOUT COMPLICATION, WITHOUT LONG-TERM CURRENT USE OF INSULIN (HCC): ICD-10-CM

## 2021-01-21 PROCEDURE — G0439 PPPS, SUBSEQ VISIT: HCPCS | Performed by: NURSE PRACTITIONER

## 2021-01-21 SDOH — ECONOMIC STABILITY: INCOME INSECURITY: HOW HARD IS IT FOR YOU TO PAY FOR THE VERY BASICS LIKE FOOD, HOUSING, MEDICAL CARE, AND HEATING?: NOT VERY HARD

## 2021-01-21 SDOH — ECONOMIC STABILITY: FOOD INSECURITY: WITHIN THE PAST 12 MONTHS, YOU WORRIED THAT YOUR FOOD WOULD RUN OUT BEFORE YOU GOT MONEY TO BUY MORE.: NEVER TRUE

## 2021-01-21 SDOH — ECONOMIC STABILITY: TRANSPORTATION INSECURITY
IN THE PAST 12 MONTHS, HAS LACK OF TRANSPORTATION KEPT YOU FROM MEETINGS, WORK, OR FROM GETTING THINGS NEEDED FOR DAILY LIVING?: NO

## 2021-01-21 ASSESSMENT — PATIENT HEALTH QUESTIONNAIRE - PHQ9
SUM OF ALL RESPONSES TO PHQ QUESTIONS 1-9: 0
SUM OF ALL RESPONSES TO PHQ QUESTIONS 1-9: 0
2. FEELING DOWN, DEPRESSED OR HOPELESS: 0
SUM OF ALL RESPONSES TO PHQ9 QUESTIONS 1 & 2: 0
SUM OF ALL RESPONSES TO PHQ QUESTIONS 1-9: 0

## 2021-01-21 ASSESSMENT — LIFESTYLE VARIABLES: HOW OFTEN DO YOU HAVE A DRINK CONTAINING ALCOHOL: 0

## 2021-01-21 NOTE — PATIENT INSTRUCTIONS
Personalized Preventive Plan for Velasquez Priya - 1/21/2021  Medicare offers a range of preventive health benefits. Some of the tests and screenings are paid in full while other may be subject to a deductible, co-insurance, and/or copay. Some of these benefits include a comprehensive review of your medical history including lifestyle, illnesses that may run in your family, and various assessments and screenings as appropriate. After reviewing your medical record and screening and assessments performed today your provider may have ordered immunizations, labs, imaging, and/or referrals for you. A list of these orders (if applicable) as well as your Preventive Care list are included within your After Visit Summary for your review. Other Preventive Recommendations:    · A preventive eye exam performed by an eye specialist is recommended every 1-2 years to screen for glaucoma; cataracts, macular degeneration, and other eye disorders. · A preventive dental visit is recommended every 6 months. · Try to get at least 150 minutes of exercise per week or 10,000 steps per day on a pedometer . · Order or download the FREE \"Exercise & Physical Activity: Your Everyday Guide\" from [employer]. Call 2-219.867.8920 or search [employer] online. · You need 0769-5887 mg of calcium and 9653-0206 IU of vitamin D per day. It is possible to meet your calcium requirement with diet alone, but a vitamin D supplement is usually necessary to meet this goal.  · When exposed to the sun, use a sunscreen that protects against both UVA and UVB radiation with an SPF of 30 or greater. Reapply every 2 to 3 hours or after sweating, drying off with a towel, or swimming. · Always wear a seat belt when traveling in a car. Always wear a helmet when riding a bicycle or motorcycle. ok.   Personalized Preventive Plan for Velasquez Liv - 1/21/2021

## 2021-04-06 ENCOUNTER — TELEPHONE (OUTPATIENT)
Dept: PRIMARY CARE CLINIC | Age: 71
End: 2021-04-06

## 2021-04-06 DIAGNOSIS — I10 ESSENTIAL HYPERTENSION: ICD-10-CM

## 2021-04-06 DIAGNOSIS — E11.9 TYPE 2 DIABETES MELLITUS WITHOUT COMPLICATION, WITHOUT LONG-TERM CURRENT USE OF INSULIN (HCC): ICD-10-CM

## 2021-04-06 DIAGNOSIS — I25.10 CORONARY ARTERY DISEASE INVOLVING NATIVE CORONARY ARTERY OF NATIVE HEART WITHOUT ANGINA PECTORIS: Primary | ICD-10-CM

## 2021-04-06 RX ORDER — GLUCOSAMINE HCL/CHONDROITIN SU 500-400 MG
CAPSULE ORAL
Qty: 100 STRIP | Refills: 3 | Status: SHIPPED | OUTPATIENT
Start: 2021-04-06

## 2021-04-06 RX ORDER — METFORMIN HYDROCHLORIDE 500 MG/1
TABLET, EXTENDED RELEASE ORAL
Qty: 180 TABLET | Refills: 0 | Status: SHIPPED | OUTPATIENT
Start: 2021-04-06 | End: 2021-07-08

## 2021-04-06 RX ORDER — GLUCOSAMINE HCL/CHONDROITIN SU 500-400 MG
CAPSULE ORAL
Qty: 100 STRIP | Refills: 3 | Status: SHIPPED | OUTPATIENT
Start: 2021-04-06 | End: 2021-04-06 | Stop reason: SDUPTHER

## 2021-04-06 RX ORDER — LISINOPRIL 20 MG/1
TABLET ORAL
Qty: 90 TABLET | Refills: 3 | Status: SHIPPED | OUTPATIENT
Start: 2021-04-06 | End: 2021-04-13

## 2021-04-06 RX ORDER — METOPROLOL SUCCINATE 25 MG/1
25 TABLET, EXTENDED RELEASE ORAL DAILY
Qty: 90 TABLET | Refills: 3 | Status: SHIPPED | OUTPATIENT
Start: 2021-04-06 | End: 2022-04-07

## 2021-04-06 NOTE — TELEPHONE ENCOUNTER
Marcia Ponce called to request a refill on her medication. Last office visit : 1/21/2021   Next office visit : 4/22/2021     Requested Prescriptions     Signed Prescriptions Disp Refills    blood glucose monitor strips 100 strip 3     Sig: Brand: CONTOUR NEXT Test once daily & as needed for symptoms of irregular blood glucose. Dispense sufficient amount for indicated testing frequency plus additional to accommodate PRN testing needs.      Authorizing Provider: Jey West User: Andre Benson MA

## 2021-04-06 NOTE — TELEPHONE ENCOUNTER
Marcia Ponce called to request a refill on her medication. Last office visit : 1/21/2021   Next office visit : 4/22/2021     Requested Prescriptions     Signed Prescriptions Disp Refills    metFORMIN (GLUCOPHAGE-XR) 500 MG extended release tablet 180 tablet 0     Sig: Take 1 tablet by mouth twice daily     Authorizing Provider: Kim Fernandez     Ordering User: Rosalba Merino    blood glucose monitor strips 100 strip 3     Sig: Brand: CONTOUR NEXT  Test once daily & as needed for symptoms of irregular blood glucose. Dispense sufficient amount for indicated testing frequency plus additional to accommodate PRN testing needs.      Authorizing Provider: Kim Fernandez     Ordering User: Herminio Verdin MA

## 2021-04-13 ENCOUNTER — OFFICE VISIT (OUTPATIENT)
Dept: CARDIOLOGY CLINIC | Age: 71
End: 2021-04-13
Payer: MEDICARE

## 2021-04-13 VITALS
HEART RATE: 60 BPM | OXYGEN SATURATION: 99 % | HEIGHT: 61 IN | BODY MASS INDEX: 37.19 KG/M2 | DIASTOLIC BLOOD PRESSURE: 98 MMHG | SYSTOLIC BLOOD PRESSURE: 212 MMHG | WEIGHT: 197 LBS

## 2021-04-13 DIAGNOSIS — I10 ESSENTIAL HYPERTENSION: ICD-10-CM

## 2021-04-13 DIAGNOSIS — I25.10 CORONARY ARTERY DISEASE INVOLVING NATIVE CORONARY ARTERY OF NATIVE HEART WITHOUT ANGINA PECTORIS: Primary | ICD-10-CM

## 2021-04-13 DIAGNOSIS — E78.2 MIXED HYPERLIPIDEMIA: ICD-10-CM

## 2021-04-13 DIAGNOSIS — Z95.2 S/P AVR: ICD-10-CM

## 2021-04-13 PROCEDURE — 99214 OFFICE O/P EST MOD 30 MIN: CPT | Performed by: NURSE PRACTITIONER

## 2021-04-13 RX ORDER — CLONIDINE HYDROCHLORIDE 0.1 MG/1
0.1 TABLET ORAL 3 TIMES DAILY PRN
Qty: 60 TABLET | Refills: 1 | Status: SHIPPED | OUTPATIENT
Start: 2021-04-13 | End: 2022-09-29 | Stop reason: SDUPTHER

## 2021-04-13 RX ORDER — LISINOPRIL 20 MG/1
TABLET ORAL
Qty: 1 TABLET | Refills: 0
Start: 2021-04-13 | End: 2021-04-27

## 2021-04-13 NOTE — PROGRESS NOTES
Mild mitral regurgitation I34.0    Mild tricuspid regurgitation I07.1    Valvular heart disease I38     Past Medical History:   Diagnosis Date    Arteriosclerotic heart disease (ASHD)     Coronary artery disease involving native coronary artery of native heart without angina pectoris     Family history of heart disease     father  37 MI, brother 46  MI, brother 61  MI, sister 58  MI    Hyperlipidemia     Hypertension     Mild mitral regurgitation 2016    Mild tricuspid regurgitation 2016    Moderate aortic stenosis 2016    s/p AVR, Hx: aortic valve replacement 3/29/16.      Murmur     Type 2 diabetes mellitus without complication (HCC)      Past Surgical History:   Procedure Laterality Date    AORTIC VALVE REPAIR N/A 3/29/2016    21-mm [employer] bovine pericardial valve, model No. [id number], serial No. [id number], Dr. Jairo Nevarez      X 3 miscarriages     Family History   Problem Relation Age of Onset    Diabetes Mother     Other Father         thickness in blood    Heart Disease Father     Other Sister         thickness in blood    Diabetes Sister     Other Brother         thickness in blood    Other Paternal Uncle         thickness in blood    Other Paternal Grandfather         thickness in blood    Heart Disease Paternal Grandfather     Other Brother         thickness in blood    Diabetes Brother     Heart Disease Brother     Other Brother         thickness in blood    Other Brother         thickness in blood    Colon Cancer Neg Hx     Colon Polyps Neg Hx      Social History     Tobacco Use    Smoking status: Never Smoker    Smokeless tobacco: Never Used   Substance Use Topics    Alcohol use: No      Current Outpatient Medications   Medication Sig Dispense Refill    metFORMIN (GLUCOPHAGE-XR) 500 MG extended release tablet Take 1 tablet by mouth twice daily 180 tablet 0    metoprolol succinate (TOPROL XL) 25 MG extended release tablet Take 1 tablet by mouth daily 90 tablet 3    lisinopril (PRINIVIL;ZESTRIL) 20 MG tablet Take 1 tablet by mouth once daily 90 tablet 3    blood glucose monitor strips Brand: CONTOUR NEXT Test once daily & as needed for symptoms of irregular blood glucose. Dispense sufficient amount for indicated testing frequency plus additional to accommodate PRN testing needs. 100 strip 3    Lancets MISC 1 each by Does not apply route daily 100 each 3    triamcinolone (KENALOG) 0.025 % cream Apply Topically 1 Tube 0    atorvastatin (LIPITOR) 40 MG tablet Take 1 tablet by mouth daily 30 tablet 11    Lancets MISC 1 each by Does not apply route daily Dx:e11.9 100 each 5    Multiple Vitamins-Minerals (THERAPEUTIC MULTIVITAMIN-MINERALS) tablet Take 1 tablet by mouth daily      aspirin 81 MG tablet Take 81 mg by mouth nightly        No current facility-administered medications for this visit. Allergies: Corticosteroids and Niacin and related    Review of Systems  Constitutional - no appetite change, or unexpected weight change. No fever, chills or diaphoresis. No significant change in activity level or new onset of fatigue. HEENT - no significant rhinorrhea or epistaxis. No tinnitus or significant hearing loss. Eyes - no sudden vision change or amaurosis. No corneal arcus, xantholasma, subconjunctival hemorrhage or discharge. Respiratory - no significant wheezing, stridor, apnea or cough. No dyspnea on exertion or shortness of air. Cardiovascular - no exertional chest pain to suggest myocardial ischemia. No orthopnea or PND. No sensation of sustained arrythmia. No occurrence of slow heart rate. No palpitations. No claudication. Gastrointestinal - no abdominal swelling or pain. No blood in stool. No severe constipation, diarrhea, nausea, or vomiting. Genitourinary - no dysuria, frequency, or urgency. No flank pain or hematuria.    Musculoskeletal - no back pain or preserved leaflet   mobility. Mild mitral regurgitation is present. Normal functioning bioprosthetic valve in the aortic position. Tricuspid valve is structurally normal.   Mild tricuspid regurgitation with estimated RVSP of 33 mm Hg. Normal size left atrium. Normal left ventricular size with preserved LV function and an estimated  ejection fraction of approximately 55-60%. Normal left ventricular wall thickness. No regional wall motion abnormalities.    Signature   Electronically signed by Marj Ham MD(Interpreting   physician) on 11/10/2020 04:56 PM    Lab Results   Component Value Date    WBC 7.9 07/30/2020    HGB 13.4 07/30/2020    HCT 40.9 07/30/2020    MCV 87.2 07/30/2020     07/30/2020     Lab Results   Component Value Date     07/30/2020    K 4.2 07/30/2020     07/30/2020    CO2 29 07/30/2020    BUN 18 07/30/2020    CREATININE 0.7 07/30/2020    GLUCOSE 91 07/30/2020    CALCIUM 10.0 07/30/2020    PROT 7.1 07/30/2020    LABALBU 4.2 07/30/2020    BILITOT 0.4 07/30/2020    ALKPHOS 77 07/30/2020    AST 21 07/30/2020    ALT 20 07/30/2020    LABGLOM >60 07/30/2020    GFRAA >59 07/30/2020    GLOB 3.5 12/30/2016       Lab Results   Component Value Date    CHOL 167 07/30/2020    CHOL 147 (L) 12/20/2019    CHOL 159 (L) 05/08/2019     Lab Results   Component Value Date    TRIG 186 (H) 07/30/2020    TRIG 151 (H) 12/20/2019    TRIG 149 05/08/2019     Lab Results   Component Value Date    HDL 43 (L) 07/30/2020    HDL 42 (L) 12/20/2019    HDL 39 (L) 05/08/2019     Lab Results   Component Value Date    LDLCALC 87 07/30/2020    LDLCALC 75 12/20/2019    LDLCALC 90 05/08/2019     BP Readings from Last 3 Encounters:   04/13/21 (!) 220/100   01/21/21 (!) 180/80   10/13/20 (!) 150/82    Pulse Readings from Last 3 Encounters:   04/13/21 60   01/21/21 77   10/13/20 74        Wt Readings from Last 3 Encounters:   04/13/21 197 lb (89.4 kg)   01/21/21 190 lb (86.2 kg)   10/13/20 170 lb (77.1 kg) Assessment/Plan:   Diagnosis Orders   1. Coronary artery disease involving native coronary artery of native heart without angina pectoris  lisinopril (PRINIVIL;ZESTRIL) 20 MG tablet   2. Essential hypertension  lisinopril (PRINIVIL;ZESTRIL) 20 MG tablet   3. S/P AVR      bioprosthetic   4. Mixed hyperlipidemia       Stable CV status without overt heart failure, sensed arrhythmia or angina. CAD - stable on current medical management. HTN - BP elevated today but very stressed and upset. Patient will monitor at home and keep a log. Telephone follow up in 2 weeks. S/p AVR - 11/20 echo showed normal EF with mild MR and normally functioning bioprosthetic AVR. Follow with echo. Hyperlipidemia - LDL 87. Continues on Lipitor 40 mg daily. Patient is compliant with medication regimen. Previous cardiac history and records reviewed. Continue current medical management for cardiac related condition. Continue other current medications as directed. Continue to follow up with primary care provider for non cardiac medical problems. If your primary care provider is outside of the [facility], please request that your labs be faxed to this office at [phone]. BP goal 130/80 or less. Call the office with any problems, questions or concerns at [phone]. Cardiology follow up as scheduled in [address] appointments. Educational included in patient instructions. Heart health.       LLUA Palm

## 2021-04-13 NOTE — PATIENT INSTRUCTIONS
New instructions for today:  Monitor your blood pressure twice daily and keep a log. Your blood pressure goal is 130/80 or less. We will discuss in 2 weeks by either scheduled telephone encounter or patient call back. Office phone number [phone]. Patient Instructions:  Continue current medications as prescribed. Always keep a current medication list. Bring your medications to every office visit. Continue to follow up with primary care provider for non cardiac medical problems. Call the office with any problems, questions or concerns at [phone]. If you have been asked to keep a blood pressure log, do so for 2 weeks. Call the office to report readings to the triage nurse at [phone]. Follow up with cardiologist as scheduled. The following educational material has been included in this after visit summary for your review: Life simple 7. Heart health. Life simple 7  1) Manage blood pressure - high blood pressure is a major risk factor for heart disease and stroke. Keeping blood pressure in health range reduces strain on your heart, arteries and kidneys. Blood pressure goal is less than 130/80. 2) Control cholesterol - contributes to plaque, which can clog arteries and lead to heart disease and stroke. When you control your cholesterol you are giving your arteries their best chance to remain clear. It is recommended that you get cholesterol lab work done once a year. 3) Reduce blood sugar - most of the food we eat is turning into glucose or blood sugar that our body uses for energy. Over time, high levels of blood sugar can damage your heart, kidneys, eyes and nerves. 4) Get active - living an active life is one of the most rewarding gifts you can give yourself and those you love. Simply put, daily physical activity increases your length and quality of life. Strive to exercise 15 minutes most days of the week.   5)  Eat better - A healthy diet is one of your best weapons for fighting cardiovascular disease. When you eat a heart healthy diet, you improve your chances for feeling good and staying healthy for life. 6)  Lose weight - when you shed extra fat an unnecessary pounds, you reduce the burden on your hear, lungs, blood vessels and skeleton. You give yourself the gift of active living, you lower your blood pressure and help yourself feel better. 7) Stop smoking - cigarette smokers have a higher risk of developing cardiovascular disease. If  You smoke, quitting is the best thing you can do for your health. Check American Heart Association on line for more information on Life's Simple 7 and tips for healthy living. A Healthy Heart: Care Instructions  Your Care Instructions     Coronary artery disease, also called heart disease, occurs when a substance called plaque builds up in the vessels that supply oxygen-rich blood to your heart muscle. This can narrow the blood vessels and reduce blood flow. A heart attack happens when blood flow is completely blocked. A high-fat diet, smoking, and other factors increase the risk of heart disease. Your doctor has found that you have a chance of having heart disease. You can do lots of things to keep your heart healthy. It may not be easy, but you can change your diet, exercise more, and quit smoking. These steps really work to lower your chance of heart disease. Follow-up care is a key part of your treatment and safety. Be sure to make and go to all appointments, and call your doctor if you are having problems. It's also a good idea to know your test results and keep a list of the medicines you take. How can you care for yourself at home? Diet  · Use less salt when you cook and eat. This helps lower your blood pressure. Taste food before salting. Add only a little salt when you think you need it. With time, your taste buds will adjust to less salt.   · Eat fewer snack items, fast foods, canned soups, and other high-salt, high-fat, processed foods. · Read food labels and try to avoid saturated and trans fats. They increase your risk of heart disease by raising cholesterol levels. · Limit the amount of solid fat-butter, margarine, and shortening-you eat. Use olive, peanut, or canola oil when you cook. Bake, broil, and steam foods instead of frying them. · Eat a variety of fruit and vegetables every day. Dark green, deep orange, red, or yellow fruits and vegetables are especially good for you. Examples include spinach, carrots, peaches, and berries. · Foods high in fiber can reduce your cholesterol and provide important vitamins and minerals. High-fiber foods include whole-grain cereals and breads, oatmeal, beans, brown rice, citrus fruits, and apples. · Eat lean proteins. Heart-healthy proteins include seafood, lean meats and poultry, eggs, beans, peas, nuts, seeds, and soy products. · Limit drinks and foods with added sugar. These include candy, desserts, and soda pop. Lifestyle changes  · If your doctor recommends it, get more exercise. Walking is a good choice. Bit by bit, increase the amount you walk every day. Try for at least 30 minutes on most days of the week. You also may want to swim, bike, or do other activities. · Do not smoke. If you need help quitting, talk to your doctor about stop-smoking programs and medicines. These can increase your chances of quitting for good. Quitting smoking may be the most important step you can take to protect your heart. It is never too late to quit. · Limit alcohol to 2 drinks a day for men and 1 drink a day for women. Too much alcohol can cause health problems. · Manage other health problems such as diabetes, high blood pressure, and high cholesterol. If you think you may have a problem with alcohol or drug use, talk to your doctor. Medicines  · Take your medicines exactly as prescribed. Call your doctor if you think you are having a problem with your medicine.   · If your doctor recommends aspirin, take the amount directed each day. Make sure you take aspirin and not another kind of pain reliever, such as acetaminophen (Tylenol). When should you call for help? [id number] if you have symptoms of a heart attack. These may include:  · Chest pain or pressure, or a strange feeling in the chest.  · Sweating. · Shortness of breath. · Pain, pressure, or a strange feeling in the back, neck, jaw, or upper belly or in one or both shoulders or arms. · Lightheadedness or sudden weakness. · A fast or irregular heartbeat. After you call 911, the  may tell you to chew 1 adult-strength or 2 to 4 low-dose aspirin. Wait for an ambulance. Do not try to drive yourself. Watch closely for changes in your health, and be sure to contact your doctor if you have any problems. Where can you learn more? Go to https://Fanwards.[employer]Eye. org and sign in to your [employer] account. Enter M722 in the [employer] box to learn more about \"A Healthy Heart: Care Instructions. \"     If you do not have an account, please click on the \"Sign Up Now\" link. Current as of: December 16, 2019               Content Version: 12.5  © 9280-1412 [employer]. Care instructions adapted under license by [facility] ([facility]). If you have questions about a medical condition or this instruction, always ask your healthcare professional. [place] [address] any warranty or liability for your use of this information.

## 2021-04-22 ENCOUNTER — VIRTUAL VISIT (OUTPATIENT)
Dept: PRIMARY CARE CLINIC | Age: 71
End: 2021-04-22
Payer: MEDICARE

## 2021-04-22 DIAGNOSIS — I10 ESSENTIAL HYPERTENSION: ICD-10-CM

## 2021-04-22 DIAGNOSIS — Z12.31 BREAST CANCER SCREENING BY MAMMOGRAM: Primary | ICD-10-CM

## 2021-04-22 PROCEDURE — 99213 OFFICE O/P EST LOW 20 MIN: CPT | Performed by: NURSE PRACTITIONER

## 2021-04-22 ASSESSMENT — ENCOUNTER SYMPTOMS
COUGH: 0
PHOTOPHOBIA: 0
VOICE CHANGE: 0
BACK PAIN: 0
NAUSEA: 0
SHORTNESS OF BREATH: 0
RHINORRHEA: 0
VOMITING: 0
COLOR CHANGE: 0

## 2021-04-22 ASSESSMENT — PATIENT HEALTH QUESTIONNAIRE - PHQ9
SUM OF ALL RESPONSES TO PHQ QUESTIONS 1-9: 0
2. FEELING DOWN, DEPRESSED OR HOPELESS: 0

## 2021-04-22 NOTE — PROGRESS NOTES
urinating and flank pain. Musculoskeletal: Negative for back pain and neck pain. Skin: Negative for color change and rash. Allergic/Immunologic: Negative for environmental allergies and food allergies. Neurological: Negative for dizziness, speech difficulty and headaches. Hematological: Does not bruise/bleed easily. Psychiatric/Behavioral: Negative for sleep disturbance and suicidal ideas. Prior to Visit Medications    Medication Sig Taking? Authorizing Provider   lisinopril (PRINIVIL;ZESTRIL) 20 MG tablet Take 1-1/2 tab daily Yes LULA Cisneros   cloNIDine (CATAPRES) 0.1 MG tablet Take 1 tablet by mouth 3 times daily as needed for High Blood Pressure (BP greater than 170/90) Yes LULA Cisneros   metFORMIN (GLUCOPHAGE-XR) 500 MG extended release tablet Take 1 tablet by mouth twice daily Yes LULA Zhu   metoprolol succinate (TOPROL XL) 25 MG extended release tablet Take 1 tablet by mouth daily Yes LULA Bolanos   blood glucose monitor strips Brand: CONTOUR NEXT Test once daily & as needed for symptoms of irregular blood glucose. Dispense sufficient amount for indicated testing frequency plus additional to accommodate PRN testing needs.  Yes LULA Zhu   Lancets MISC 1 each by Does not apply route daily Yes LULA Zhu   triamcinolone (KENALOG) 0.025 % cream Apply Topically Yes LULA Zhu   Multiple Vitamins-Minerals (THERAPEUTIC MULTIVITAMIN-MINERALS) tablet Take 1 tablet by mouth daily Yes Historical Provider, MD   aspirin 81 MG tablet Take 81 mg by mouth nightly  Yes Historical Provider, MD   atorvastatin (LIPITOR) 40 MG tablet Take 1 tablet by mouth daily  LULA Zhu       Social History     Tobacco Use    Smoking status: Never Smoker    Smokeless tobacco: Never Used   Substance Use Topics    Alcohol use: No    Drug use: No        Allergies   Allergen Reactions    Corticosteroids Palpitations and Other (See Comments) Causes elevated blood pressure    Niacin And Related Palpitations and Other (See Comments)     Causes elevated blood pressure   ,   Past Medical History:   Diagnosis Date    Arteriosclerotic heart disease (ASHD)     Coronary artery disease involving native coronary artery of native heart without angina pectoris     Family history of heart disease     father  37 MI, brother 46  MI, brother 61  MI, sister 58  MI    Hyperlipidemia     Hypertension     Mild mitral regurgitation 2016    Mild tricuspid regurgitation 2016    Moderate aortic stenosis 2016    s/p AVR, Hx: aortic valve replacement 3/29/16.      Murmur     Type 2 diabetes mellitus without complication (HCC)    ,   Past Surgical History:   Procedure Laterality Date    AORTIC VALVE REPAIR N/A 3/29/2016    21-mm [employer] bovine pericardial valve, model No. 3300TFX, serial No. [id number], Dr. Waldrop Grew      X 3 miscarriages   ,   Social History     Tobacco Use    Smoking status: Never Smoker    Smokeless tobacco: Never Used   Substance Use Topics    Alcohol use: No    Drug use: No   ,   Family History   Problem Relation Age of Onset    Diabetes Mother     Other Father         thickness in blood    Heart Disease Father     Other Sister         thickness in blood    Diabetes Sister     Other Brother         thickness in blood    Other Paternal Uncle         thickness in blood    Other Paternal Grandfather         thickness in blood    Heart Disease Paternal Grandfather     Other Brother         thickness in blood    Diabetes Brother     Heart Disease Brother     Other Brother         thickness in blood    Other Brother         thickness in blood    Colon Cancer Neg Hx     Colon Polyps Neg Hx    ,   Immunization History   Administered Date(s) Administered    COVID-19, Moderna, PF, 100mcg/0.5mL 2021    Influenza Virus Vaccine 2014  Influenza, High Dose (Fluzone 65 yrs and older) 01/24/2018, 11/26/2018    Influenza, Margarita Hernandez, IM, PF (6 mo and older Fluzone, Flulaval, Fluarix, and 3 yrs and older Afluria) 12/13/2016    Influenza, Triv, inactivated, subunit, adjuvanted, IM (Fluad 65 yrs and older) 12/13/2019    Pneumococcal Conjugate 13-valent ([id number]) 01/05/2015, 11/26/2018    Pneumococcal Conjugate 7-valent (Prevnar7) 11/03/2014    Pneumococcal Conjugate Vaccine 12/01/2017    Pneumococcal Polysaccharide ([id number]) 01/06/2020    Tdap (Boostrix, Adacel) 01/24/2018    Zoster Live (Zostavax) 11/03/2014   ,   Health Maintenance   Topic Date Due    Diabetic retinal exam  Never done    Shingles Vaccine (2 of 3) 12/29/2014    Breast cancer screen  05/31/2019    Colon cancer screen colonoscopy  03/11/2020    Diabetic microalbuminuria test  05/08/2020    Diabetic foot exam  06/13/2020    COVID-19 Vaccine (2 - Moderna 2-dose series) 04/29/2021    A1C test (Diabetic or Prediabetic)  07/30/2021    Lipid screen  07/30/2021    Potassium monitoring  07/30/2021    Creatinine monitoring  07/30/2021    Flu vaccine (Season Ended) 09/01/2021    Annual Wellness Visit (AWV)  01/22/2022    DTaP/Tdap/Td vaccine (2 - Td) 01/24/2028    DEXA (modify frequency per FRAX score)  Completed    Pneumococcal 65+ years Vaccine  Completed    Hepatitis C screen  Completed    Hepatitis A vaccine  Aged Out    Hib vaccine  Aged Out    Meningococcal (ACWY) vaccine  Aged Out       PHYSICAL EXAMINATION:  [ INSTRUCTIONS:  \"[x]\" Indicates a positive item  \"[]\" Indicates a negative item  -- DELETE ALL ITEMS NOT EXAMINED]  [x] Alert  [x] Oriented to person/place/time    [x] No apparent distress  [] Toxic appearing    [] Face flushed appearing [x] Sclera clear  [] Lips are cyanotic      [x] Breathing appears normal  [] Appears tachypneic      [] Rash on visible skin    [x] Cranial Nerves II-XII grossly intact    [x] Motor grossly intact in visible upper extremities    [x] Motor grossly intact in visible lower extremities    [x] Normal Mood  [] Anxious appearing    [] Depressed appearing  [] Confused appearing      [] Poor short term memory  [] Poor long term memory    [] OTHER:      Due to this being a TeleHealth encounter, evaluation of the following organ systems is limited: Vitals/Constitutional/EENT/Resp/CV/GI//MS/Neuro/Skin/Heme-Lymph-Imm. ASSESSMENT/PLAN:  1. Breast cancer screening by mammogram      2. Essential hypertension  - Patient to monitor BP as I instructed her and keep her cardiology follow-up next week. I would like her to have fasting labs prior to her next visit with me in 3 months. - Comprehensive Metabolic Panel; Future  - Hemoglobin A1C; Future  - Lipid Panel; Future  - CBC Auto Differential; Future  - TSH without Reflex; Future      Return in about 3 months (around 7/22/2021) for in office, follow-up with labs. An  electronic signature was used to authenticate this note. --LULA Warner on 4/22/2021 at 2:40 PM        Pursuant to the emergency declaration under the [facility] [place], 1135 waiver authority and the [employer] Act, this Virtual  Visit was conducted, with patient's consent, to reduce the patient's risk of exposure to COVID-19 and provide continuity of care for an established patient. Services were provided through a video synchronous discussion virtually to substitute for in-person clinic visit.

## 2021-04-27 ENCOUNTER — VIRTUAL VISIT (OUTPATIENT)
Dept: CARDIOLOGY CLINIC | Age: 71
End: 2021-04-27
Payer: MEDICARE

## 2021-04-27 DIAGNOSIS — Z95.2 S/P AVR: ICD-10-CM

## 2021-04-27 DIAGNOSIS — I25.10 CORONARY ARTERY DISEASE INVOLVING NATIVE CORONARY ARTERY OF NATIVE HEART WITHOUT ANGINA PECTORIS: ICD-10-CM

## 2021-04-27 DIAGNOSIS — E78.2 MIXED HYPERLIPIDEMIA: ICD-10-CM

## 2021-04-27 DIAGNOSIS — I10 ESSENTIAL HYPERTENSION: Primary | ICD-10-CM

## 2021-04-27 PROCEDURE — 99441 PR PHYS/QHP TELEPHONE EVALUATION 5-10 MIN: CPT | Performed by: NURSE PRACTITIONER

## 2021-04-27 RX ORDER — LISINOPRIL 20 MG/1
TABLET ORAL
Qty: 180 TABLET | Refills: 3 | Status: SHIPPED | OUTPATIENT
Start: 2021-04-27 | End: 2021-10-29

## 2021-04-27 NOTE — PATIENT INSTRUCTIONS
New instructions for today:  Start taking Lisinopril 20 mg (1) tab twice daily. Monitor your blood pressure twice daily and keep a log. Your blood pressure goal is 130/80 or less. We will discuss in 2 weeks by either scheduled telephone encounter or patient call back. Office phone number [phone]. Patient Instructions:  Continue current medications as prescribed. Always keep a current medication list. Bring your medications to every office visit. Continue to follow up with primary care provider for non cardiac medical problems. Call the office with any problems, questions or concerns at [phone]. If you have been asked to keep a blood pressure log, do so for 2 weeks. Call the office to report readings to the triage nurse at [phone]. Follow up with cardiologist as scheduled. The following educational material has been included in this after visit summary for your review: Life simple 7. Heart health. Life simple 7  1) Manage blood pressure - high blood pressure is a major risk factor for heart disease and stroke. Keeping blood pressure in health range reduces strain on your heart, arteries and kidneys. Blood pressure goal is less than 130/80. 2) Control cholesterol - contributes to plaque, which can clog arteries and lead to heart disease and stroke. When you control your cholesterol you are giving your arteries their best chance to remain clear. It is recommended that you get cholesterol lab work done once a year. 3) Reduce blood sugar - most of the food we eat is turning into glucose or blood sugar that our body uses for energy. Over time, high levels of blood sugar can damage your heart, kidneys, eyes and nerves. 4) Get active - living an active life is one of the most rewarding gifts you can give yourself and those you love. Simply put, daily physical activity increases your length and quality of life. Strive to exercise 15 minutes most days of the week.   5)  Eat better - A healthy diet is one of your best weapons for fighting cardiovascular disease. When you eat a heart healthy diet, you improve your chances for feeling good and staying healthy for life. 6)  Lose weight - when you shed extra fat an unnecessary pounds, you reduce the burden on your hear, lungs, blood vessels and skeleton. You give yourself the gift of active living, you lower your blood pressure and help yourself feel better. 7) Stop smoking - cigarette smokers have a higher risk of developing cardiovascular disease. If  You smoke, quitting is the best thing you can do for your health. Check American Heart Association on line for more information on Life's Simple 7 and tips for healthy living. A Healthy Heart: Care Instructions  Your Care Instructions     Coronary artery disease, also called heart disease, occurs when a substance called plaque builds up in the vessels that supply oxygen-rich blood to your heart muscle. This can narrow the blood vessels and reduce blood flow. A heart attack happens when blood flow is completely blocked. A high-fat diet, smoking, and other factors increase the risk of heart disease. Your doctor has found that you have a chance of having heart disease. You can do lots of things to keep your heart healthy. It may not be easy, but you can change your diet, exercise more, and quit smoking. These steps really work to lower your chance of heart disease. Follow-up care is a key part of your treatment and safety. Be sure to make and go to all appointments, and call your doctor if you are having problems. It's also a good idea to know your test results and keep a list of the medicines you take. How can you care for yourself at home? Diet  · Use less salt when you cook and eat. This helps lower your blood pressure. Taste food before salting. Add only a little salt when you think you need it. With time, your taste buds will adjust to less salt.   · Eat fewer snack items, fast foods, canned soups, and other high-salt, high-fat, processed foods. · Read food labels and try to avoid saturated and trans fats. They increase your risk of heart disease by raising cholesterol levels. · Limit the amount of solid fat-butter, margarine, and shortening-you eat. Use olive, peanut, or canola oil when you cook. Bake, broil, and steam foods instead of frying them. · Eat a variety of fruit and vegetables every day. Dark green, deep orange, red, or yellow fruits and vegetables are especially good for you. Examples include spinach, carrots, peaches, and berries. · Foods high in fiber can reduce your cholesterol and provide important vitamins and minerals. High-fiber foods include whole-grain cereals and breads, oatmeal, beans, brown rice, citrus fruits, and apples. · Eat lean proteins. Heart-healthy proteins include seafood, lean meats and poultry, eggs, beans, peas, nuts, seeds, and soy products. · Limit drinks and foods with added sugar. These include candy, desserts, and soda pop. Lifestyle changes  · If your doctor recommends it, get more exercise. Walking is a good choice. Bit by bit, increase the amount you walk every day. Try for at least 30 minutes on most days of the week. You also may want to swim, bike, or do other activities. · Do not smoke. If you need help quitting, talk to your doctor about stop-smoking programs and medicines. These can increase your chances of quitting for good. Quitting smoking may be the most important step you can take to protect your heart. It is never too late to quit. · Limit alcohol to 2 drinks a day for men and 1 drink a day for women. Too much alcohol can cause health problems. · Manage other health problems such as diabetes, high blood pressure, and high cholesterol. If you think you may have a problem with alcohol or drug use, talk to your doctor. Medicines  · Take your medicines exactly as prescribed.  Call your doctor if you think you are having a problem with your medicine. · If your doctor recommends aspirin, take the amount directed each day. Make sure you take aspirin and not another kind of pain reliever, such as acetaminophen (Tylenol). When should you call for help? [id number] if you have symptoms of a heart attack. These may include:  · Chest pain or pressure, or a strange feeling in the chest.  · Sweating. · Shortness of breath. · Pain, pressure, or a strange feeling in the back, neck, jaw, or upper belly or in one or both shoulders or arms. · Lightheadedness or sudden weakness. · A fast or irregular heartbeat. After you call 911, the  may tell you to chew 1 adult-strength or 2 to 4 low-dose aspirin. Wait for an ambulance. Do not try to drive yourself. Watch closely for changes in your health, and be sure to contact your doctor if you have any problems. Where can you learn more? Go to https://EyeScience.[employer]). org and sign in to your [employer] account. Enter Y028 in the [employer] box to learn more about \"A Healthy Heart: Care Instructions. \"     If you do not have an account, please click on the \"Sign Up Now\" link. Current as of: December 16, 2019               Content Version: 12.5  © 7964-1104 [employer]. Care instructions adapted under license by [facility] ([facility]). If you have questions about a medical condition or this instruction, always ask your healthcare professional. [place] [address] any warranty or liability for your use of this information. Patient Education        Learning About High Blood Pressure  What is high blood pressure? Blood pressure is a measure of how hard the blood pushes against the walls of your arteries. It's normal for blood pressure to go up and down throughout the day. But if it stays up, you have high blood pressure. Another name for high blood pressure is hypertension. Two numbers tell you your blood pressure.  The first number is the systolic pressure (top number). It shows how hard the blood pushes when your heart is pumping. The second number is the diastolic pressure (bottom number). It shows how hard the blood pushes between heartbeats, when your heart is relaxed and filling with blood. Your doctor will give you a goal for your blood pressure based on your health and your age. High blood pressure (hypertension) means that the top number stays high, or the bottom number stays high, or both. High blood pressure increases the risk of stroke, heart attack, and other problems. What happens when you have high blood pressure? · Blood flows through your arteries with too much force. Over time, this can damage the heart and the walls of your arteries. But you can't feel it. High blood pressure usually doesn't cause symptoms. · High blood pressure makes your heart work harder. And that can lead to heart failure, which means your heart doesn't pump as much blood as your body needs. · Fat and calcium start to build up in your arteries. This buildup is called hardening of the arteries. It can cause many problems including a heart attack and stroke. · Arteries also carry blood and oxygen to organs like your eyes, kidneys, and brain. If high blood pressure damages those arteries, it can lead to vision loss, kidney disease, stroke, and a higher risk of dementia. How can you prevent high blood pressure? · Stay at a healthy weight. · Try to limit how much sodium you eat to less than 2,300 milligrams (mg) a day. If you limit your sodium to 1,500 mg a day, you can lower your blood pressure even more. ? Buy foods that are labeled \"unsalted,\" \"sodium-free,\" or \"low-sodium. \" Foods labeled \"reduced-sodium\" and \"light sodium\" may still have too much sodium. ? Flavor your food with garlic, lemon juice, onion, vinegar, herbs, and spices instead of salt. Do not use soy sauce, steak sauce, onion salt, garlic salt, mustard, or ketchup on your food. ?  Use less salt (or none) when recipes call for it. You can often use half the salt a recipe calls for without losing flavor. · Be physically active. Get at least 30 minutes of exercise on most days of the week. Walking is a good choice. You also may want to do other activities, such as running, swimming, cycling, or playing tennis or team sports. · Limit alcohol to 2 drinks a day for men and 1 drink a day for women. · Eat plenty of fruits, vegetables, and low-fat dairy products. Eat less saturated and total fats. How is high blood pressure treated? · Your doctor will suggest making lifestyle changes to help your heart. For example, your doctor may ask you to eat healthy foods, quit smoking, lose extra weight, and be more active. · If lifestyle changes don't help enough, your doctor may recommend that you take medicine. · When blood pressure is very high, medicines are needed to lower it. Follow-up care is a key part of your treatment and safety. Be sure to make and go to all appointments, and call your doctor if you are having problems. It's also a good idea to know your test results and keep a list of the medicines you take. Where can you learn more? Go to https://Chill.compeMake Meaning.Simparel. org and sign in to your [employer] account. Enter P501 in the [employer] box to learn more about \"Learning About High Blood Pressure. \"     If you do not have an account, please click on the \"Sign Up Now\" link. Current as of: August 31, 2020               Content Version: 12.8  © 2006-2021 [employer]. Care instructions adapted under license by [facility] ([facility]). If you have questions about a medical condition or this instruction, always ask your healthcare professional. [place] [address] any warranty or liability for your use of this information.

## 2021-04-27 NOTE — PROGRESS NOTES
Heena Velazquez is a 66 y.o. female evaluated via telephone on 4/27/2021. Consent:  She and/or health care decision maker is aware that that she may receive a bill for this telephone service, depending on her insurance coverage, and has provided verbal consent to proceed: Yes    Documentation:  I communicated with the patient and/or health care decision maker about HTN. Ho Johnston Details of this discussion including any medical advice provided: Heart health. HTN. I affirm this is a Patient Initiated Episode with an Established Patient who has not had a related appointment within my department in the past 7 days or scheduled within the next 24 hours. Total Time: minutes: 5-10 minutes    Note: not billable if this call serves to triage the patient into an appointment for the relevant concern    Ramandeep Medley      4/27/2021    Audio Patient Encouter(During [facility]- public health emergency)  The telephone encourter was conducted with patient in their residence from [address] with LULA Salas; assistance by Belle Uribe MA.    HPI:  Minerva Ponce   Diagnosis Orders   1. Essential hypertension  lisinopril (PRINIVIL;ZESTRIL) 20 MG tablet   2. Coronary artery disease involving native coronary artery of native heart without angina pectoris  lisinopril (PRINIVIL;ZESTRIL) 20 MG tablet   3. S/P AVR     4. Mixed hyperlipidemia       The patient presents today for audio evaluation regarding HTN. Her BP today is 147/72 and heart rate 72. The patient denies symptoms to suggest myocardial ischemia, heart failure or arrhythmia. The patient's PCP monitors cholesterol. SUBJECTIVE:  Gabriel Fierro denies exertional chest pain, shortness of breath, orthopnea, paroxysmal nocturnal dyspnea, syncope, presyncope, sensed arrhythmia, edema and fatigue. The patient denies numbness or weakness to suggest cerebrovascular accident or transient ischemic attack.       Review of Systems    Prior to Visit Medications exertional chest pain to suggest myocardial ischemia. No orthopnea or PND. No sensation of sustained arrythmia. No occurrence of slow heart rate. No palpitations. No claudication. Gastrointestinal  no abdominal swelling or pain. No blood in stool. No severe constipation, diarrhea, nausea, or vomiting. Genitourinary  no dysuria, frequency, or urgency. No flank pain or hematuria. Musculoskeletal  no back pain or myalgia. No problems with gait. Extremities - no clubbing, cyanosis or extremity edema. Skin  no color change or rash. No pallor. No new surgical incision. Neurologic  no speech difficulty, facial asymmetry or lateralizing weakness. No seizures, presyncope or syncope. No significant dizziness. Hematologic  no easy bruising or excessive bleeding. Psychiatric  no severe anxiety or insomnia. No confusion. All other review of systems are negative. DATA REVIEWED:  11/10/20 echo  Mitral valve leaflets are mildly thickened with preserved leaflet   mobility. Mild mitral regurgitation is present. Normal functioning bioprosthetic valve in the aortic position. Tricuspid valve is structurally normal.   Mild tricuspid regurgitation with estimated RVSP of 33 mm Hg. Normal size left atrium. Normal left ventricular size with preserved LV function and an estimated   ejection fraction of approximately 55-60%. Normal left ventricular wall thickness. No regional wall motion abnormalities.     Signature   Electronically signed by Marj Ham MD(Interpreting   physician) on 11/10/2020 04:56 PM    Lab Results   Component Value Date     07/30/2020    K 4.2 07/30/2020     07/30/2020    CO2 29 07/30/2020    BUN 18 07/30/2020    CREATININE 0.7 07/30/2020    GLUCOSE 91 07/30/2020    CALCIUM 10.0 07/30/2020    PROT 7.1 07/30/2020    LABALBU 4.2 07/30/2020    BILITOT 0.4 07/30/2020    ALKPHOS 77 07/30/2020    AST 21 07/30/2020    ALT 20 07/30/2020    LABGLOM >60 07/30/2020 GFRAA >59 07/30/2020    GLOB 3.5 12/30/2016       Lab Results   Component Value Date    WBC 7.9 07/30/2020    HGB 13.4 07/30/2020    HCT 40.9 07/30/2020    MCV 87.2 07/30/2020     07/30/2020     Lab Results   Component Value Date    CHOL 167 07/30/2020    CHOL 147 (L) 12/20/2019    CHOL 159 (L) 05/08/2019     Lab Results   Component Value Date    TRIG 186 (H) 07/30/2020    TRIG 151 (H) 12/20/2019    TRIG 149 05/08/2019     Lab Results   Component Value Date    HDL 43 (L) 07/30/2020    HDL 42 (L) 12/20/2019    HDL 39 (L) 05/08/2019     Lab Results   Component Value Date    LDLCALC 87 07/30/2020    LDLCALC 75 12/20/2019    LDLCALC 90 05/08/2019     ASSESSMENT/PlAN:   Diagnosis Orders   1. Essential hypertension  lisinopril (PRINIVIL;ZESTRIL) 20 MG tablet   2. Coronary artery disease involving native coronary artery of native heart without angina pectoris  lisinopril (PRINIVIL;ZESTRIL) 20 MG tablet   3. S/P AVR     4. Mixed hyperlipidemia       Stable cardiac status with no overt heart failure, angina or sensed arrhythmia. CAD - stable on current medical management. HTN - remains uncontrolled. Advised to increase Lisinopril to 20 mg (1) tab BID. Home BP log with call back in 2 weeks. Hyperlipidemia - on Lipitor 40 mg daily. LDL 87. Patient compliant with medication regimen. Continue current medical management for cardiac condition. Continue current medications as prescribed. BP goal is 130/80 or less. If your primary care provider is outside of the [facility], please request your labs be faxed to this office at [phone]. Continue to follow up with primary care provider for non cardiac medical problems. Call the office with any problems, questions or concerns at [phone]. Follow up with cardiologist as scheduled in [address] [place] [address] Rd. Dr. Carmela Carrera as scheduled. The following educational material has been included in this after visit summary for patient review:  Heart [employer].      [address] [place] [address] Vanessa Sams is a 66 y.o. female being evaluated by a telephone encounter to address concerns as mentioned above. A caregiver was present when appropriate. Due to this being a TeleHealth encounter (During [facility]). Pursuant to the emergency declaration under the [address] [place] [address], [address] [place] [address] and the [employer] and [employer] Act, this Virtual Visit was conducted with patient's (and/or legal guardian's) consent, to reduce the patient's risk of exposure to COVID-19 and provide necessary medical care. The patient (and/or legal guardian) has also been advised to contact this office for worsening conditions or problems, and seek emergency medical treatment and/or call 911 if deemed necessary. Services were provided through a telephone discussion virtually to substitute for in-person clinic visit. Patient and provider were located at their individual homes. --LULA Caputo on 4/27/2021 at 10:03 AM    An electronic signature was used to authenticate this note.

## 2021-05-17 ENCOUNTER — TELEPHONE (OUTPATIENT)
Dept: CARDIOLOGY CLINIC | Age: 71
End: 2021-05-17

## 2021-05-17 NOTE — TELEPHONE ENCOUNTER
Hugo,  I reviewed her BP log. Thank you so much. Her BP is better. Let's just keep everything same and have her call back BP log in one week. If readings are still consistently greater than 130/80, will plan on adjusting BP meds.   Thanks, [employer]

## 2021-05-17 NOTE — TELEPHONE ENCOUNTER
4-21-21  166/80        4-22-21 175/86  164/78      4-23-21 184/86  139/65  4-24-21 152/103 143/92  4-25-21 163/71    4-26-21   153/81  4-27-21 147/72  140/64  4-28-21 124/66  147/71    5-17-21 142/90      Lisinopril increased to 20 mg BID on 4-27-21

## 2021-05-25 NOTE — TELEPHONE ENCOUNTER
Pt BP readings starting 5-18-21  146/93  141/68  144/87  122/60  156/74  121/68  129/68  144/72  148/87  141/67  141/67  124/64  157/72  136/78

## 2021-06-02 NOTE — TELEPHONE ENCOUNTER
Can add HCTZ 12.5 mg daily in the a.m.   Monitor blood pressure as she has been doing and do follow-up visit with [employer] next week

## 2021-06-02 NOTE — TELEPHONE ENCOUNTER
Patient called to report BP readings:    5/26 950A 149/85 60, 9PM 134/66 57  5/27 330PM 146/78 56, 146/78 56  5/28 930AM 140/77 61, 745PM 144/66 55  5/29 1020AM 166/82 59, 840PM 143/68 55   5/31 910AM 136/70 58  6/1 830AM 153/89 52, 520PM 152/75 58  6/2 830AM 132/73 57    She takes lisinopril 20 mg BID, metoprolol 25 mg PM

## 2021-06-03 RX ORDER — HYDROCHLOROTHIAZIDE 12.5 MG/1
12.5 TABLET ORAL DAILY
Qty: 30 TABLET | Refills: 3 | Status: SHIPPED | OUTPATIENT
Start: 2021-06-03 | End: 2021-10-29

## 2021-06-03 NOTE — TELEPHONE ENCOUNTER
Patient notified to add HCTZ 12.5 mg QAM for HTN. Phone visit scheduled next week with [employer]. She voiced understanding. Med order placed.

## 2021-06-09 ENCOUNTER — VIRTUAL VISIT (OUTPATIENT)
Dept: CARDIOLOGY CLINIC | Age: 71
End: 2021-06-09
Payer: MEDICARE

## 2021-06-09 DIAGNOSIS — I10 ESSENTIAL HYPERTENSION: Primary | ICD-10-CM

## 2021-06-09 DIAGNOSIS — E78.2 MIXED HYPERLIPIDEMIA: ICD-10-CM

## 2021-06-09 DIAGNOSIS — I25.10 CORONARY ARTERY DISEASE INVOLVING NATIVE CORONARY ARTERY OF NATIVE HEART WITHOUT ANGINA PECTORIS: ICD-10-CM

## 2021-06-09 DIAGNOSIS — Z95.2 S/P AVR: ICD-10-CM

## 2021-06-09 PROCEDURE — 99441 PR PHYS/QHP TELEPHONE EVALUATION 5-10 MIN: CPT | Performed by: NURSE PRACTITIONER

## 2021-06-09 NOTE — PROGRESS NOTES
Bela Salamanca is a 66 y.o. female evaluated via telephone on 6/9/2021. Consent:  She and/or health care decision maker is aware that that she may receive a bill for this telephone service, depending on her insurance coverage, and has provided verbal consent to proceed: Yes    Documentation:  I communicated with the patient and/or health care decision maker about HTN. Details of this discussion including any medical advice provided: heart health. I affirm this is a Patient Initiated Episode with an Established Patient who has not had a related appointment within my department in the past 7 days or scheduled within the next 24 hours. Total Time: minutes: 5-10 minutes    Note: not billable if this call serves to triage the patient into an appointment for the relevant concern    LULA Nascimento      6/9/2021    Audio Patient Encouter(During [facility]-45 public health emergency)  The telephone encourter was conducted with patient in their residence from [address] with LULA Luke; assistance by Osmel Norton MA.    HPI:  Oliverio Ponce   Diagnosis Orders   1. Essential hypertension     2. Coronary artery disease involving native coronary artery of native heart without angina pectoris     3. S/P AVR     4. Mixed hyperlipidemia         /81, pulse 65. The patient presents today for audio evaluation regarding HTN. Lisinpril was increased at last visit with BP goal of 130/80 or less. Patient reports doing very well. BP today 129/81 and heart rate 65. The patient denies symptoms to suggest myocardial ischemia, heart failure or arrhythmia. The patient's PCP monitors cholesterol. SUBJECTIVE:  Edna Schwab denies exertional chest pain, shortness of breath, orthopnea, paroxysmal nocturnal dyspnea, syncope, presyncope, sensed arrhythmia, edema and fatigue. The patient denies numbness or weakness to suggest cerebrovascular accident or transient ischemic attack.       Review of Systems    Prior to Visit Medications    Medication Sig Taking? Authorizing Provider   hydroCHLOROthiazide (HYDRODIURIL) 12.5 MG tablet Take 1 tablet by mouth daily Yes LULA Rosario   lisinopril (PRINIVIL;ZESTRIL) 20 MG tablet Take (1) tab twice daily Yes LULA Daniel   cloNIDine (CATAPRES) 0.1 MG tablet Take 1 tablet by mouth 3 times daily as needed for High Blood Pressure (BP greater than 170/90) Yes LULA Daniel   metFORMIN (GLUCOPHAGE-XR) 500 MG extended release tablet Take 1 tablet by mouth twice daily Yes LULA Holland   metoprolol succinate (TOPROL XL) 25 MG extended release tablet Take 1 tablet by mouth daily Yes LULA Yun   blood glucose monitor strips Brand: CONTOUR NEXT Test once daily & as needed for symptoms of irregular blood glucose. Dispense sufficient amount for indicated testing frequency plus additional to accommodate PRN testing needs. Yes LULA Holland   Lancets MISC 1 each by Does not apply route daily Yes LULA Holland   triamcinolone (KENALOG) 0.025 % cream Apply Topically Yes LULA Holland   atorvastatin (LIPITOR) 40 MG tablet Take 1 tablet by mouth daily Yes LULA Holland   Multiple Vitamins-Minerals (THERAPEUTIC MULTIVITAMIN-MINERALS) tablet Take 1 tablet by mouth daily Yes Historical Provider, MD   aspirin 81 MG tablet Take 81 mg by mouth nightly  Yes Historical Provider, MD       Social History     Tobacco Use    Smoking status: Never Smoker    Smokeless tobacco: Never Used   Substance Use Topics    Alcohol use: No    Drug use: No        REVIEW OF SYSTEMS:  Constitutional  no appetite change, or unexpected weight change. No fever, chills or diaphoresis. No significant change in activity level or new onset of fatigue. HEENT  no significant rhinorrhea or epistaxis. No tinnitus or significant hearing loss. Eyes  no sudden vision change or amaurosis.  No corneal arcus, xantholasma, subconjunctival hemorrhage or discharge. Respiratory  no significant wheezing, stridor, apnea or cough. No dyspnea on exertion or shortness of air. Cardiovascular  no exertional chest pain to suggest myocardial ischemia. No orthopnea or PND. No sensation of sustained arrythmia. No occurrence of slow heart rate. No palpitations. No claudication. Gastrointestinal  no abdominal swelling or pain. No blood in stool. No severe constipation, diarrhea, nausea, or vomiting. Genitourinary  no dysuria, frequency, or urgency. No flank pain or hematuria. Musculoskeletal  no back pain or myalgia. No problems with gait. Extremities - no clubbing, cyanosis or extremity edema. Skin  no color change or rash. No pallor. No new surgical incision. Neurologic  no speech difficulty, facial asymmetry or lateralizing weakness. No seizures, presyncope or syncope. No significant dizziness. Hematologic  no easy bruising or excessive bleeding. Psychiatric  no severe anxiety or insomnia. No confusion. All other review of systems are negative. DATA REVIEWED:  11/10/20 echo  Mitral valve leaflets are mildly thickened with preserved leaflet   mobility. Mild mitral regurgitation is present. Normal functioning bioprosthetic valve in the aortic position. Tricuspid valve is structurally normal.   Mild tricuspid regurgitation with estimated RVSP of 33 mm Hg. Normal size left atrium. Normal left ventricular size with preserved LV function and an estimated   ejection fraction of approximately 55-60%. Normal left ventricular wall thickness. No regional wall motion abnormalities. Signature   Electronically signed by Nhung Dangelo MD(Interpreting   physician) on 11/10/2020 04:56 PM    3/22/16 cath - Dr. Richmond Ellis   1. Moderate non-occlusive CAD of the LAD   2. Aortic stenosis, Mean gradient via cath on pullback 29mmHg    Recommendations    1. Routine post cath care   2.  Ct surgery consult for AVR    Signatures Electronically signed by Virgil Fernandez MD(Performing Physician)   on 03/22/2016 10:48    Lab Results   Component Value Date    WBC 7.9 07/30/2020    HGB 13.4 07/30/2020    HCT 40.9 07/30/2020    MCV 87.2 07/30/2020     07/30/2020     Lab Results   Component Value Date     07/30/2020    K 4.2 07/30/2020     07/30/2020    CO2 29 07/30/2020    BUN 18 07/30/2020    CREATININE 0.7 07/30/2020    GLUCOSE 91 07/30/2020    CALCIUM 10.0 07/30/2020    PROT 7.1 07/30/2020    LABALBU 4.2 07/30/2020    BILITOT 0.4 07/30/2020    ALKPHOS 77 07/30/2020    AST 21 07/30/2020    ALT 20 07/30/2020    LABGLOM >60 07/30/2020    GFRAA >59 07/30/2020    GLOB 3.5 12/30/2016       Lab Results   Component Value Date    CHOL 167 07/30/2020    CHOL 147 (L) 12/20/2019    CHOL 159 (L) 05/08/2019     Lab Results   Component Value Date    TRIG 186 (H) 07/30/2020    TRIG 151 (H) 12/20/2019    TRIG 149 05/08/2019     Lab Results   Component Value Date    HDL 43 (L) 07/30/2020    HDL 42 (L) 12/20/2019    HDL 39 (L) 05/08/2019     Lab Results   Component Value Date    LDLCALC 87 07/30/2020    LDLCALC 75 12/20/2019    LDLCALC 90 05/08/2019       ASSESSMENT/PlAN:   Diagnosis Orders   1. Essential hypertension     2. Coronary artery disease involving native coronary artery of native heart without angina pectoris     3. S/P AVR     4. Mixed hyperlipidemia       Stable cardiac status with no overt heart failure, angina or sensed arrhythmia. HTN - normotensive on current regimen. Continue same. CAD - stable on current medical management. Continue same. Hyperlipidemia - LDL 87 on Lipitor 40 mg daily. S/p AVR - 11/10/20 echo showed normal functioning bioprosthetic valve in the aortic position. Follow with echo. Patient compliant with medication regimen. Continue current medical management for cardiac condition. Continue current medications as prescribed. BP goal is 130/80 or less.   If your primary care provider is outside of the [facility], please request your labs be faxed to this office at [phone]. Continue to follow up with primary care provider for non cardiac medical problems. Call the office with any problems, questions or concerns at [phone]. Follow up with cardiologist as scheduled in [address] The following educational material has been included in this after visit summary for patient review:  Heart healthKojo Garcia is a 66 y.o. female being evaluated by a telephone encounter to address concerns as mentioned above. A caregiver was present when appropriate. Due to this being a TeleHealth encounter (During [facility]-57 public health emergency). Pursuant to the emergency declaration under the [address] [place] [address], [address] [place] [address] authority and the [employer] and [employer] Act, this Virtual Visit was conducted with patient's (and/or legal guardian's) consent, to reduce the patient's risk of exposure to COVID-19 and provide necessary medical care. The patient (and/or legal guardian) has also been advised to contact this office for worsening conditions or problems, and seek emergency medical treatment and/or call 911 if deemed necessary. Services were provided through a telephone discussion virtually to substitute for in-person clinic visit. Patient and provider were located at their individual homes. --LULA Jordan on 6/9/2021 at 11:19 AM    An electronic signature was used to authenticate this note.

## 2021-06-09 NOTE — PATIENT INSTRUCTIONS
New instructions for today:      Patient Instructions:  Continue current medications as prescribed. Always keep a current medication list. Bring your medications to every office visit. Continue to follow up with primary care provider for non cardiac medical problems. Call the office with any problems, questions or concerns at [phone]. If you have been asked to keep a blood pressure log, do so for 2 weeks. Call the office to report readings to the triage nurse at [phone]. Follow up with cardiologist as scheduled. The following educational material has been included in this after visit summary for your review: Life simple 7. Heart health. Life simple 7  1) Manage blood pressure - high blood pressure is a major risk factor for heart disease and stroke. Keeping blood pressure in health range reduces strain on your heart, arteries and kidneys. Blood pressure goal is less than 130/80. 2) Control cholesterol - contributes to plaque, which can clog arteries and lead to heart disease and stroke. When you control your cholesterol you are giving your arteries their best chance to remain clear. It is recommended that you get cholesterol lab work done once a year. 3) Reduce blood sugar - most of the food we eat is turning into glucose or blood sugar that our body uses for energy. Over time, high levels of blood sugar can damage your heart, kidneys, eyes and nerves. 4) Get active - living an active life is one of the most rewarding gifts you can give yourself and those you love. Simply put, daily physical activity increases your length and quality of life. Strive to exercise 15 minutes most days of the week. 5)  Eat better - A healthy diet is one of your best weapons for fighting cardiovascular disease. When you eat a heart healthy diet, you improve your chances for feeling good and staying healthy for life.   6)  Lose weight - when you shed extra fat an unnecessary pounds, you reduce the burden on your hear, lungs, blood vessels and skeleton. You give yourself the gift of active living, you lower your blood pressure and help yourself feel better. 7) Stop smoking - cigarette smokers have a higher risk of developing cardiovascular disease. If  You smoke, quitting is the best thing you can do for your health. Check American Heart Association on line for more information on Life's Simple 7 and tips for healthy living. A Healthy Heart: Care Instructions  Your Care Instructions     Coronary artery disease, also called heart disease, occurs when a substance called plaque builds up in the vessels that supply oxygen-rich blood to your heart muscle. This can narrow the blood vessels and reduce blood flow. A heart attack happens when blood flow is completely blocked. A high-fat diet, smoking, and other factors increase the risk of heart disease. Your doctor has found that you have a chance of having heart disease. You can do lots of things to keep your heart healthy. It may not be easy, but you can change your diet, exercise more, and quit smoking. These steps really work to lower your chance of heart disease. Follow-up care is a key part of your treatment and safety. Be sure to make and go to all appointments, and call your doctor if you are having problems. It's also a good idea to know your test results and keep a list of the medicines you take. How can you care for yourself at home? Diet  · Use less salt when you cook and eat. This helps lower your blood pressure. Taste food before salting. Add only a little salt when you think you need it. With time, your taste buds will adjust to less salt. · Eat fewer snack items, fast foods, canned soups, and other high-salt, high-fat, processed foods. · Read food labels and try to avoid saturated and trans fats. They increase your risk of heart disease by raising cholesterol levels. · Limit the amount of solid fat-butter, margarine, and shortening-you eat.  Use olive, peanut, or canola oil when you cook. Bake, broil, and steam foods instead of frying them. · Eat a variety of fruit and vegetables every day. Dark green, deep orange, red, or yellow fruits and vegetables are especially good for you. Examples include spinach, carrots, peaches, and berries. · Foods high in fiber can reduce your cholesterol and provide important vitamins and minerals. High-fiber foods include whole-grain cereals and breads, oatmeal, beans, brown rice, citrus fruits, and apples. · Eat lean proteins. Heart-healthy proteins include seafood, lean meats and poultry, eggs, beans, peas, nuts, seeds, and soy products. · Limit drinks and foods with added sugar. These include candy, desserts, and soda pop. Lifestyle changes  · If your doctor recommends it, get more exercise. Walking is a good choice. Bit by bit, increase the amount you walk every day. Try for at least 30 minutes on most days of the week. You also may want to swim, bike, or do other activities. · Do not smoke. If you need help quitting, talk to your doctor about stop-smoking programs and medicines. These can increase your chances of quitting for good. Quitting smoking may be the most important step you can take to protect your heart. It is never too late to quit. · Limit alcohol to 2 drinks a day for men and 1 drink a day for women. Too much alcohol can cause health problems. · Manage other health problems such as diabetes, high blood pressure, and high cholesterol. If you think you may have a problem with alcohol or drug use, talk to your doctor. Medicines  · Take your medicines exactly as prescribed. Call your doctor if you think you are having a problem with your medicine. · If your doctor recommends aspirin, take the amount directed each day. Make sure you take aspirin and not another kind of pain reliever, such as acetaminophen (Tylenol). When should you call for help? [id number] if you have symptoms of a heart attack.  These

## 2021-10-29 ENCOUNTER — OFFICE VISIT (OUTPATIENT)
Dept: CARDIOLOGY CLINIC | Age: 71
End: 2021-10-29
Payer: MEDICARE

## 2021-10-29 VITALS
BODY MASS INDEX: 36.25 KG/M2 | WEIGHT: 192 LBS | SYSTOLIC BLOOD PRESSURE: 216 MMHG | HEIGHT: 61 IN | HEART RATE: 72 BPM | DIASTOLIC BLOOD PRESSURE: 101 MMHG

## 2021-10-29 DIAGNOSIS — Z95.2 S/P AVR: Primary | ICD-10-CM

## 2021-10-29 PROCEDURE — 99214 OFFICE O/P EST MOD 30 MIN: CPT | Performed by: INTERNAL MEDICINE

## 2021-10-29 RX ORDER — LISINOPRIL 20 MG/1
20 TABLET ORAL DAILY
COMMUNITY
End: 2022-04-26

## 2021-10-29 NOTE — PROGRESS NOTES
[facility], [place]. [address], [facility] [address] [facility]  [phone] office  [phone] fax      OFFICE VISIT:  10/29/2021    Carmela Montoya - : 1950  Reason For Visit:  Shaun Peterson is a 66 y.o. female who is here for 6 Month Follow-Up    History:    The patient presents today for cardiology follow up. Shaun Peterson is very stressed today about a her local bank situation. As a result, her BP is very elevated today. She reports has been running 140-149/90. The patient reports no chest pain. The patient denies symptoms to suggest myocardial ischemia, heart failure or arrhythmia. The patient's PCP monitors cholesterol. Subjective  Shaun Peterson denies exertional chest pain, shortness of breath, orthopnea, paroxysmal nocturnal dyspnea, syncope, presyncope, sensed arrhythmia, edema and fatigue. The patient denies numbness or weakness to suggest cerebrovascular accident or transient ischemic attack.       Carmela Montoya has the following history as recorded in [facility]:  Patient Active Problem List   Diagnosis Code    Hyperlipidemia E78.5    Family history of heart disease Z82.49    Murmur R01.1    Family history of heart disease Z82.49    Chest pain R07.9    SIGALA (dyspnea on exertion) R06.00    Type 2 diabetes mellitus without complication (HCC) L22.8    S/P AVR Z95.2    Coronary artery disease involving native coronary artery of native heart without angina pectoris I25.10    Essential hypertension I10    Postoperative atrial fibrillation (HCC) I97.89, I48.91    Palpitations R00.2    Mild mitral regurgitation I34.0    Mild tricuspid regurgitation I07.1    Valvular heart disease I38     Past Medical History:   Diagnosis Date    Arteriosclerotic heart disease (ASHD)     Coronary artery disease involving native coronary artery of native heart without angina pectoris     Family history of heart disease     father  37 MI, tablet by mouth daily 90 tablet 3    blood glucose monitor strips Brand: CONTOUR NEXT Test once daily & as needed for symptoms of irregular blood glucose. Dispense sufficient amount for indicated testing frequency plus additional to accommodate PRN testing needs. 100 strip 3    Lancets MISC 1 each by Does not apply route daily 100 each 3    triamcinolone (KENALOG) 0.025 % cream Apply Topically 1 Tube 0    Multiple Vitamins-Minerals (THERAPEUTIC MULTIVITAMIN-MINERALS) tablet Take 1 tablet by mouth daily      aspirin 81 MG tablet Take 81 mg by mouth nightly        No current facility-administered medications for this visit. Allergies: Corticosteroids and Niacin and related    Review of Systems  Constitutional - no appetite change, or unexpected weight change. No fever, chills or diaphoresis. No significant change in activity level or new onset of fatigue. HEENT - no significant rhinorrhea or epistaxis. No tinnitus or significant hearing loss. Eyes - no sudden vision change or amaurosis. No corneal arcus, xantholasma, subconjunctival hemorrhage or discharge. Respiratory - no significant wheezing, stridor, apnea or cough. No dyspnea on exertion or shortness of air. Cardiovascular - no exertional chest pain to suggest myocardial ischemia. No orthopnea or PND. No sensation of sustained arrythmia. No occurrence of slow heart rate. No palpitations. No claudication. Gastrointestinal - no abdominal swelling or pain. No blood in stool. No severe constipation, diarrhea, nausea, or vomiting. Genitourinary - no dysuria, frequency, or urgency. No flank pain or hematuria. Musculoskeletal - no back pain or myalgia. No problems with gait. Extremities - no clubbing, cyanosis or extremity edema. Skin - no color change or rash. No pallor. No new surgical incision. Neurologic - no speech difficulty, facial asymmetry or lateralizing weakness. No seizures, presyncope or syncope.   No significant dizziness. Hematologic - no easy bruising or excessive bleeding. Psychiatric - no severe anxiety or insomnia. No confusion. All other review of systems are negative. Objective  Vital Signs - BP (!) 216/101   Pulse 72   Ht 5' 1\" (1.549 m)   Wt 192 lb (87.1 kg)   BMI 36.28 kg/m²   General - Adrien Mackay is alert, cooperative, and pleasant. Well groomed. No acute distress. Body habitus - Body mass index is 36.28 kg/m². HEENT - Head is normocephalic. No circumoral cyanosis. Dentition is normal.  EYES -   Lids normal without ptosis. No discharge, edema or subconjunctival hemorrhage. Neck - Symmetrical without apparent mass or lymphadenopathy. Respiratory - Normal respiratory effort without use of accessory muscles. Ausculatation reveals vesicular breath sounds without crackles, wheezes, rub or rhonchi. Cardiovascular - No jugular venous distention. Auscultation reveals regular rate and rhythm. No audible clicks, gallop or rub. No murmur. No lower extremity varicosities. No carotid bruits. Abdominal -  No visible distention, mass or pulsations. Extremities - No clubbing or cyanosis. No statis dermatitis or ulcers. No edema. Musculoskeletal -   No Osler's nodes. No kyphosis or scoliosis. Gait is even and regular without limp or shuffle. Ambulates without assistance. Skin -  Warm and dry; no rash or pallor. No new surgical wound. Neurological - No focal neurological deficits. Thought processes coherent. No apparent tremor. Oriented to person, place and time. Psychiatric -  Appropriate affect and mood. Data reviewed:   Mitral valve leaflets are mildly thickened with preserved leaflet   mobility. Mild mitral regurgitation is present. Normal functioning bioprosthetic valve in the aortic position. Tricuspid valve is structurally normal.   Mild tricuspid regurgitation with estimated RVSP of 33 mm Hg. Normal size left atrium.    Normal left ventricular size with preserved LV function and an estimated  ejection fraction of approximately 55-60%. Normal left ventricular wall thickness. No regional wall motion abnormalities. Signature   Electronically signed by Jarod Pink MD(Interpreting   physician) on 11/10/2020 04:56 PM    Lab Results   Component Value Date    WBC 7.9 07/30/2020    HGB 13.4 07/30/2020    HCT 40.9 07/30/2020    MCV 87.2 07/30/2020     07/30/2020     Lab Results   Component Value Date     07/30/2020    K 4.2 07/30/2020     07/30/2020    CO2 29 07/30/2020    BUN 18 07/30/2020    CREATININE 0.7 07/30/2020    GLUCOSE 91 07/30/2020    CALCIUM 10.0 07/30/2020    PROT 7.1 07/30/2020    LABALBU 4.2 07/30/2020    BILITOT 0.4 07/30/2020    ALKPHOS 77 07/30/2020    AST 21 07/30/2020    ALT 20 07/30/2020    LABGLOM >60 07/30/2020    GFRAA >59 07/30/2020    GLOB 3.5 12/30/2016       Lab Results   Component Value Date    CHOL 167 07/30/2020    CHOL 147 (L) 12/20/2019    CHOL 159 (L) 05/08/2019     Lab Results   Component Value Date    TRIG 186 (H) 07/30/2020    TRIG 151 (H) 12/20/2019    TRIG 149 05/08/2019     Lab Results   Component Value Date    HDL 43 (L) 07/30/2020    HDL 42 (L) 12/20/2019    HDL 39 (L) 05/08/2019     Lab Results   Component Value Date    LDLCALC 87 07/30/2020    LDLCALC 75 12/20/2019    LDLCALC 90 05/08/2019     BP Readings from Last 3 Encounters:   10/29/21 (!) 216/101   04/13/21 (!) 212/98   01/21/21 (!) 180/80    Pulse Readings from Last 3 Encounters:   10/29/21 72   04/13/21 60   01/21/21 77        Wt Readings from Last 3 Encounters:   10/29/21 192 lb (87.1 kg)   04/13/21 197 lb (89.4 kg)   01/21/21 190 lb (86.2 kg)     Assessment/Plan:      Stable CV status without overt heart failure, sensed arrhythmia or angina. CAD - stable on current medical management. HTN - BP elevated today but very stressed and upset regarding her local bank.  She MENTIONED blood pressure is always better at home and worse during clinic visit     Patient will monitor at home and keep a log. S/p AVR - 11/20 echo showed normal EF with mild MR and normally functioning bioprosthetic AVR. Follow with echo. Hyperlipidemia - LDL 87. Continues on Lipitor 40 mg daily. Patient is compliant with medication regimen. Previous cardiac history and records reviewed. Continue current medical management for cardiac related condition. Continue other current medications as directed. Continue to follow up with primary care provider for non cardiac medical problems. If your primary care provider is outside of the [facility], please request that your labs be faxed to this office at [phone]. BP goal 130/80 or less. Call the office with any problems, questions or concerns at [phone]. Cardiology follow up as scheduled in [address] appointments. Educational included in patient instructions. Heart health.        Kellie Smith MD, [facility]  Interventional Cardiologist, Endovascular Specialist   Medical Director, Structural Heart Program   [facility]

## 2022-01-10 DIAGNOSIS — E11.9 TYPE 2 DIABETES MELLITUS WITHOUT COMPLICATION, WITHOUT LONG-TERM CURRENT USE OF INSULIN (HCC): ICD-10-CM

## 2022-01-10 DIAGNOSIS — E78.5 HYPERLIPIDEMIA, UNSPECIFIED HYPERLIPIDEMIA TYPE: ICD-10-CM

## 2022-01-11 RX ORDER — METFORMIN HYDROCHLORIDE 500 MG/1
TABLET, EXTENDED RELEASE ORAL
Qty: 180 TABLET | Refills: 0 | Status: SHIPPED | OUTPATIENT
Start: 2022-01-11 | End: 2022-03-15 | Stop reason: SDUPTHER

## 2022-01-11 RX ORDER — ATORVASTATIN CALCIUM 40 MG/1
TABLET, FILM COATED ORAL
Qty: 90 TABLET | Refills: 0 | Status: SHIPPED | OUTPATIENT
Start: 2022-01-11 | End: 2022-03-15 | Stop reason: SDUPTHER

## 2022-03-15 ENCOUNTER — OFFICE VISIT (OUTPATIENT)
Dept: PRIMARY CARE CLINIC | Age: 72
End: 2022-03-15
Payer: MEDICARE

## 2022-03-15 VITALS
SYSTOLIC BLOOD PRESSURE: 120 MMHG | HEART RATE: 68 BPM | WEIGHT: 194 LBS | HEIGHT: 62 IN | BODY MASS INDEX: 35.7 KG/M2 | TEMPERATURE: 97.1 F | OXYGEN SATURATION: 98 % | DIASTOLIC BLOOD PRESSURE: 86 MMHG

## 2022-03-15 DIAGNOSIS — Z12.11 ENCOUNTER FOR COLORECTAL CANCER SCREENING: ICD-10-CM

## 2022-03-15 DIAGNOSIS — E11.9 TYPE 2 DIABETES MELLITUS WITHOUT COMPLICATION, WITHOUT LONG-TERM CURRENT USE OF INSULIN (HCC): ICD-10-CM

## 2022-03-15 DIAGNOSIS — Z00.00 MEDICARE ANNUAL WELLNESS VISIT, SUBSEQUENT: Primary | ICD-10-CM

## 2022-03-15 DIAGNOSIS — E78.5 HYPERLIPIDEMIA, UNSPECIFIED HYPERLIPIDEMIA TYPE: ICD-10-CM

## 2022-03-15 DIAGNOSIS — Z12.31 BREAST CANCER SCREENING BY MAMMOGRAM: ICD-10-CM

## 2022-03-15 DIAGNOSIS — I10 ESSENTIAL HYPERTENSION: ICD-10-CM

## 2022-03-15 DIAGNOSIS — Z12.12 ENCOUNTER FOR COLORECTAL CANCER SCREENING: ICD-10-CM

## 2022-03-15 LAB
ALBUMIN SERPL-MCNC: 4.3 G/DL (ref 3.5–5.2)
ALP BLD-CCNC: 73 U/L (ref 35–104)
ALT SERPL-CCNC: 17 U/L (ref 5–33)
ANION GAP SERPL CALCULATED.3IONS-SCNC: 13 MMOL/L (ref 7–19)
AST SERPL-CCNC: 21 U/L (ref 5–32)
BASOPHILS ABSOLUTE: 0.1 K/UL (ref 0–0.2)
BASOPHILS RELATIVE PERCENT: 1 % (ref 0–1)
BILIRUB SERPL-MCNC: 0.5 MG/DL (ref 0.2–1.2)
BUN BLDV-MCNC: 18 MG/DL (ref 8–23)
CALCIUM SERPL-MCNC: 10.3 MG/DL (ref 8.8–10.2)
CHLORIDE BLD-SCNC: 103 MMOL/L (ref 98–111)
CHOLESTEROL, TOTAL: 182 MG/DL (ref 160–199)
CO2: 26 MMOL/L (ref 22–29)
CREAT SERPL-MCNC: 0.8 MG/DL (ref 0.5–0.9)
EOSINOPHILS ABSOLUTE: 0.3 K/UL (ref 0–0.6)
EOSINOPHILS RELATIVE PERCENT: 3.1 % (ref 0–5)
GFR AFRICAN AMERICAN: >59
GFR NON-AFRICAN AMERICAN: >60
GLUCOSE BLD-MCNC: 92 MG/DL (ref 74–109)
HBA1C MFR BLD: 5.8 % (ref 4–6)
HCT VFR BLD CALC: 43.5 % (ref 37–47)
HDLC SERPL-MCNC: 45 MG/DL (ref 65–121)
HEMOGLOBIN: 13.8 G/DL (ref 12–16)
IMMATURE GRANULOCYTES #: 0 K/UL
LDL CHOLESTEROL CALCULATED: 105 MG/DL
LYMPHOCYTES ABSOLUTE: 1.8 K/UL (ref 1.1–4.5)
LYMPHOCYTES RELATIVE PERCENT: 21.9 % (ref 20–40)
MCH RBC QN AUTO: 28.2 PG (ref 27–31)
MCHC RBC AUTO-ENTMCNC: 31.7 G/DL (ref 33–37)
MCV RBC AUTO: 88.8 FL (ref 81–99)
MONOCYTES ABSOLUTE: 0.6 K/UL (ref 0–0.9)
MONOCYTES RELATIVE PERCENT: 7.2 % (ref 0–10)
NEUTROPHILS ABSOLUTE: 5.6 K/UL (ref 1.5–7.5)
NEUTROPHILS RELATIVE PERCENT: 66.6 % (ref 50–65)
PDW BLD-RTO: 13.5 % (ref 11.5–14.5)
PLATELET # BLD: 290 K/UL (ref 130–400)
PMV BLD AUTO: 9.4 FL (ref 9.4–12.3)
POTASSIUM SERPL-SCNC: 4.6 MMOL/L (ref 3.5–5)
RBC # BLD: 4.9 M/UL (ref 4.2–5.4)
SODIUM BLD-SCNC: 142 MMOL/L (ref 136–145)
TOTAL PROTEIN: 7.7 G/DL (ref 6.6–8.7)
TRIGL SERPL-MCNC: 161 MG/DL (ref 0–149)
TSH SERPL DL<=0.05 MIU/L-ACNC: 2.28 UIU/ML (ref 0.27–4.2)
WBC # BLD: 8.4 K/UL (ref 4.8–10.8)

## 2022-03-15 PROCEDURE — G0439 PPPS, SUBSEQ VISIT: HCPCS | Performed by: NURSE PRACTITIONER

## 2022-03-15 PROCEDURE — 0004A COVID-19, PFIZER PURPLE TOP, DILUTE FOR USE, 12+ YRS, 30MCG/0.3ML DOSE: CPT | Performed by: NURSE PRACTITIONER

## 2022-03-15 PROCEDURE — 3044F HG A1C LEVEL LT 7.0%: CPT | Performed by: NURSE PRACTITIONER

## 2022-03-15 PROCEDURE — 91300 COVID-19, PFIZER PURPLE TOP, DILUTE FOR USE, 12+ YRS, 30MCG/0.3ML DOSE: CPT | Performed by: NURSE PRACTITIONER

## 2022-03-15 RX ORDER — ATORVASTATIN CALCIUM 40 MG/1
TABLET, FILM COATED ORAL
Qty: 90 TABLET | Refills: 2 | Status: SHIPPED | OUTPATIENT
Start: 2022-03-15

## 2022-03-15 RX ORDER — METFORMIN HYDROCHLORIDE 500 MG/1
500 TABLET, EXTENDED RELEASE ORAL 2 TIMES DAILY WITH MEALS
Qty: 180 TABLET | Refills: 2 | Status: SHIPPED | OUTPATIENT
Start: 2022-03-15

## 2022-03-15 SDOH — ECONOMIC STABILITY: FOOD INSECURITY: WITHIN THE PAST 12 MONTHS, THE FOOD YOU BOUGHT JUST DIDN'T LAST AND YOU DIDN'T HAVE MONEY TO GET MORE.: NEVER TRUE

## 2022-03-15 SDOH — ECONOMIC STABILITY: FOOD INSECURITY: WITHIN THE PAST 12 MONTHS, YOU WORRIED THAT YOUR FOOD WOULD RUN OUT BEFORE YOU GOT MONEY TO BUY MORE.: NEVER TRUE

## 2022-03-15 ASSESSMENT — PATIENT HEALTH QUESTIONNAIRE - PHQ9
SUM OF ALL RESPONSES TO PHQ9 QUESTIONS 1 & 2: 1
SUM OF ALL RESPONSES TO PHQ QUESTIONS 1-9: 1
1. LITTLE INTEREST OR PLEASURE IN DOING THINGS: 0
2. FEELING DOWN, DEPRESSED OR HOPELESS: 1
SUM OF ALL RESPONSES TO PHQ QUESTIONS 1-9: 1

## 2022-03-15 ASSESSMENT — SOCIAL DETERMINANTS OF HEALTH (SDOH): HOW HARD IS IT FOR YOU TO PAY FOR THE VERY BASICS LIKE FOOD, HOUSING, MEDICAL CARE, AND HEATING?: NOT VERY HARD

## 2022-03-15 NOTE — PROGRESS NOTES
After obtaining consent, and per orders of Abel Lazo, injection of COVID given in right arm by Jorge Luna MA. Patient instructed to remain in clinic for 20 minutes afterwards, and to report any adverse reaction to me immediately.

## 2022-03-15 NOTE — PROGRESS NOTES
Medicare Annual Wellness Visit    Dashawn Wing is here for Medicare AWV    Assessment & Plan   Medicare annual wellness visit, subsequent  Type 2 diabetes mellitus without complication, without long-term current use of insulin ([address]  -     metFORMIN (GLUCOPHAGE-XR) 500 MG extended release tablet; Take 1 tablet by mouth 2 times daily (with meals), Disp-180 tablet, R-2Normal  -     CBC with Auto Differential; Future  -     Comprehensive Metabolic Panel; Future  -     Hemoglobin A1C; Future  -     Lipid Panel; Future  -     Microalbumin / Creatinine Urine Ratio; Future  -     TSH; Future  Hyperlipidemia, unspecified hyperlipidemia type  -     atorvastatin (LIPITOR) 40 MG tablet; Take 1 tablet by mouth once daily, Disp-90 tablet, R-2Normal  -     CBC with Auto Differential; Future  -     Comprehensive Metabolic Panel; Future  -     Hemoglobin A1C; Future  -     Lipid Panel; Future  -     Microalbumin / Creatinine Urine Ratio; Future  -     TSH; Future  Encounter for colorectal cancer screening  -     Fecal DNA Colorectal cancer screening (Cologuard)  Breast cancer screening by mammogram  -     CINTHIA DIGITAL SCREEN W OR WO CAD BILATERAL; Future      Recommendations for Preventive Services Due: see orders and patient instructions/AVS.  Recommended screening schedule for the next 5-10 years is provided to the patient in written form: see Patient Instructions/AVS.     Return for Medicare Annual Wellness Visit in 1 year. Subjective   The following acute and/or chronic problems were also addressed today:    Patient had fasting labs today. She reports blood sugar has been in normal range when she checks it. She is due for mammogram and colon cancer screening. She states she has only left home 10 times in the last 2 years due to covid. She is due for covid booster. I do notice mild heart murmur today; she has follow-up on May 3 with Dr Maxine Sloan and will have a repeat ECHO.  She had mild MR; last visit with him in Oct 2021; hx bioprosthetic aortic valve. She denies shortness of breath, palpitations, fatigue, dizziness. Patient's complete Health Risk Assessment and screening values have been reviewed and are found in Flowsheets. The following problems were reviewed today and where indicated follow up appointments were made and/or referrals ordered. Positive Risk Factor Screenings with Interventions:               General Health and ACP:       Advance Directives     Power of  Living Will ACP-Advance Directive ACP-Power of     Not on File Not on File Not on File Not on File      General Health Risk Interventions:  · No Living Will: Advance Care Planning addressed with patient today    Health Habits/Nutrition:     Physical Activity:     Days of Exercise per Week: Not on file    Minutes of Exercise per Session: Not on file        Body mass index: (!) 35.48       Health Habits/Nutrition Interventions:  · Nutritional issues:  patient is not ready to address his/her nutritional/weight issues at this time             Objective   Vitals:    03/15/22 1204   BP: 120/86   Site: Left Upper Arm   Position: Sitting   Pulse: 68   Temp: 97.1 °F (36.2 °C)   TempSrc: Temporal   SpO2: 98%   Weight: 194 lb (88 kg)   Height: 5' 2\" (1.575 m)      Body mass index is 35.48 kg/m².         General Appearance: alert and oriented to person, place and time, well developed and well- nourished, in no acute distress  Skin: warm and dry, no rash or erythema  Head: normocephalic and atraumatic  Eyes: pupils equal, round, and reactive to light, extraocular eye movements intact, conjunctivae normal  ENT: tympanic membrane, external ear and ear canal normal bilaterally, nose without deformity, nasal mucosa and turbinates normal without polyps  Neck: supple and non-tender without mass, no thyromegaly or thyroid nodules, no cervical lymphadenopathy  Pulmonary/Chest: clear to auscultation bilaterally- no wheezes, rales or rhonchi, normal air movement, no respiratory distress  Cardiovascular: normal rate, regular rhythm, normal S1 and S2, mild systolic murmur, rubs, clicks, or gallops, distal pulses intact, no carotid bruits  Abdomen: soft, non-tender, non-distended, normal bowel sounds, no masses or organomegaly  Extremities: no cyanosis, clubbing or edema  Musculoskeletal: normal range of motion, no joint swelling, deformity or tenderness  Neurologic: reflexes normal and symmetric, no cranial nerve deficit, gait, coordination and speech normal       Allergies   Allergen Reactions    Corticosteroids Palpitations and Other (See Comments)     Causes elevated blood pressure    Niacin And Related Palpitations and Other (See Comments)     Causes elevated blood pressure     Prior to Visit Medications    Medication Sig Taking? Authorizing Provider   metFORMIN (GLUCOPHAGE-XR) 500 MG extended release tablet Take 1 tablet by mouth 2 times daily (with meals) Yes LULA Esteban   atorvastatin (LIPITOR) 40 MG tablet Take 1 tablet by mouth once daily Yes LULA Esteban   lisinopril (PRINIVIL;ZESTRIL) 20 MG tablet Take 20 mg by mouth daily Yes Historical Provider, MD   cloNIDine (CATAPRES) 0.1 MG tablet Take 1 tablet by mouth 3 times daily as needed for High Blood Pressure (BP greater than 170/90) Yes LULA Alcantar   metoprolol succinate (TOPROL XL) 25 MG extended release tablet Take 1 tablet by mouth daily Yes LULA Lopez   blood glucose monitor strips Brand: CONTOUR NEXT Test once daily & as needed for symptoms of irregular blood glucose. Dispense sufficient amount for indicated testing frequency plus additional to accommodate PRN testing needs.  Yes LULA Esteban   Lancets MISC 1 each by Does not apply route daily Yes LULA Esteban   triamcinolone (KENALOG) 0.025 % cream Apply Topically Yes LULA Esteban   Multiple Vitamins-Minerals (THERAPEUTIC MULTIVITAMIN-MINERALS) tablet Take 1 tablet by mouth daily Yes Historical Provider, MD   aspirin 81 MG tablet Take 81 mg by mouth nightly  Yes Historical Provider, MD Garcia (Including outside providers/suppliers regularly involved in providing care):   Patient Care Team:  LULA Kong as PCP - General (Family Nurse Practitioner)  LULA Kong as PCP - [facility] Empaneled Provider  Carmen Ronquillo MD as Consulting Physician (Internal Medicine Cardiovascular Disease)    Reviewed and updated this visit:  Tobacco  Allergies  Meds  Problems  Med Hx  Surg Hx  Soc Hx  Fam Hx

## 2022-03-15 NOTE — PATIENT INSTRUCTIONS
Advance Directives: Care Instructions  Overview  An advance directive is a legal way to state your wishes at the end of your life. It tells your family and your doctor what to do if you can't say what you want. There are two main types of advance directives. You can change them any time your wishes change. Living will. This form tells your family and your doctor your wishes about life support and other treatment. The form is also called a declaration. Medical power of . This form lets you name a person to make treatment decisions for you when you can't speak for yourself. This person is called a health care agent (health care proxy, health care surrogate). The form is also called a durable power of  for health care. If you do not have an advance directive, decisions about your medical care may be made by a family member, or by a doctor or a  who doesn't know you. It may help to think of an advance directive as a gift to the people who care for you. If you have one, they won't have to make tough decisions by themselves. Follow-up care is a key part of your treatment and safety. Be sure to make and go to all appointments, and call your doctor if you are having problems. It's also a good idea to know your test results and keep a list of the medicines you take. What should you include in an advance directive? Many states have a unique advance directive form. (It may ask you to address specific issues.) Or you might use a universal form that's approved by many states. If your form doesn't tell you what to address, it may be hard to know what to include in your advance directive. Use the questions below to help you get started. · Who do you want to make decisions about your medical care if you are not able to? · What life-support measures do you want if you have a serious illness that gets worse over time or can't be cured? · What are you most afraid of that might happen?  (Maybe you're afraid of having pain, losing your independence, or being kept alive by machines.)  · Where would you prefer to die? (Your home? A hospital? A nursing home?)  · Do you want to donate your organs when you die? · Do you want certain [employer] practices performed before you die? When should you call for help? Be sure to contact your doctor if you have any questions. Where can you learn more? Go to [email or web address] and sign in to your [employer] account. Enter R264 in the [employer] box to learn more about \"Advance Directives: Care Instructions. \"     If you do not have an account, please click on the \"Sign Up Now\" link. Current as of: March 17, 2021               Content Version: 13.1  © 4505-3023 [email or web address]. Care instructions adapted under license by [facility] ([facility]). If you have questions about a medical condition or this instruction, always ask your healthcare professional. [place] [address] any warranty or liability for your use of this information. Learning About Medical Power of   What is a medical power of ? A medical power of , also called a durable power of  for health care, is one type of the legal forms called advance directives. It lets you name the person you want to make treatment decisions for you if you can't speak or decide for yourself. The person you choose is called your health care agent. This person is also called a health care proxy or health care surrogate. A medical power of  may be called something else in your state. How do you choose a health care agent? Choose your health care agent carefully. This person may or may not be a family member. Talk to the person before you make your final decision. Make sure he or she is comfortable with this responsibility. It's a good idea to choose someone who:  · Is at least 25years old.   · Knows you well and understands what makes life meaningful for you. · Understands your Sikh and moral values. · Will do what you want, not what he or she wants. · Will be able to make difficult choices at a stressful time. · Will be able to refuse or stop treatment, if that is what you would want, even if you could die. · Will be firm and confident with health professionals if needed. · Will ask questions to get needed information. · Lives near you or agrees to travel to you if needed. Your family may help you make medical decisions while you can still be part of that process. But it's important to choose one person to be your health care agent in case you aren't able to make decisions for yourself. If you don't fill out the legal form and name a health care agent, the decisions your family can make may be limited. A health care agent may be called something else in your state. Who will make decisions for you if you don't have a health care agent? If you don't have a health care agent or a living will, you may not get the care you want. Decisions may be made by family members who disagree about your medical care. Or decisions may be made by a medical professional who doesn't know you well. In some cases, a  makes the decisions. When you name a health care agent, it is very clear who has the power to make health decisions for you. How do you name a health care agent? You name your health care agent on a legal form. This form is usually called a medical power of . Ask your hospital, state bar association, or office on aging where to find these forms. You must sign the form to make it legal. Some states require you to get the form notarized. This means that a person called a  watches you sign the form and then he or she signs the form. Some states also require that two or more witnesses sign the form. Be sure to tell your family members and doctors who your health care agent is.   Where can you learn online. · You need 4293-4024 mg of calcium and 1447-5403 IU of vitamin D per day. It is possible to meet your calcium requirement with diet alone, but a vitamin D supplement is usually necessary to meet this goal.  · When exposed to the sun, use a sunscreen that protects against both UVA and UVB radiation with an SPF of 30 or greater. Reapply every 2 to 3 hours or after sweating, drying off with a towel, or swimming. · Always wear a seat belt when traveling in a car. Always wear a helmet when riding a bicycle or motorcycle.

## 2022-03-17 ENCOUNTER — TELEPHONE (OUTPATIENT)
Dept: PRIMARY CARE CLINIC | Age: 72
End: 2022-03-17

## 2022-03-17 NOTE — TELEPHONE ENCOUNTER
----- Message from LULA Pagan sent at 3/16/2022  4:43 PM CDT -----  Please inform patient of normal lab results. Thank you!

## 2022-04-07 DIAGNOSIS — I10 ESSENTIAL HYPERTENSION: ICD-10-CM

## 2022-04-07 DIAGNOSIS — I25.10 CORONARY ARTERY DISEASE INVOLVING NATIVE CORONARY ARTERY OF NATIVE HEART WITHOUT ANGINA PECTORIS: ICD-10-CM

## 2022-04-07 RX ORDER — METOPROLOL SUCCINATE 25 MG/1
TABLET, EXTENDED RELEASE ORAL
Qty: 90 TABLET | Refills: 0 | Status: SHIPPED | OUTPATIENT
Start: 2022-04-07 | End: 2022-04-26

## 2022-04-26 ENCOUNTER — OFFICE VISIT (OUTPATIENT)
Dept: CARDIOLOGY CLINIC | Age: 72
End: 2022-04-26
Payer: MEDICARE

## 2022-04-26 VITALS
HEART RATE: 73 BPM | BODY MASS INDEX: 35.7 KG/M2 | HEIGHT: 62 IN | WEIGHT: 194 LBS | SYSTOLIC BLOOD PRESSURE: 226 MMHG | DIASTOLIC BLOOD PRESSURE: 93 MMHG

## 2022-04-26 DIAGNOSIS — I10 ESSENTIAL HYPERTENSION: Primary | ICD-10-CM

## 2022-04-26 PROCEDURE — 99214 OFFICE O/P EST MOD 30 MIN: CPT | Performed by: INTERNAL MEDICINE

## 2022-04-26 RX ORDER — LISINOPRIL 40 MG/1
40 TABLET ORAL DAILY
COMMUNITY
End: 2022-06-01 | Stop reason: SDUPTHER

## 2022-04-26 RX ORDER — METOPROLOL SUCCINATE 50 MG/1
50 TABLET, EXTENDED RELEASE ORAL DAILY
COMMUNITY
End: 2022-06-01 | Stop reason: SDUPTHER

## 2022-04-26 NOTE — PROGRESS NOTES
[facility], [place]. [address], [facility] [place] [facility], [address]92 Replaced by [facility]  [phone] office  [phone] fax      OFFICE VISIT:  2022    Gemma Macias - : 1950  Reason For Visit:  Cal Goncalves is a 67 y.o. female who is here for Follow-up    History:    The patient presents today for cardiology follow up. Cal Goncalves is very stressed today because hospitalized with late and has to be rushed, as a result, her BP is very elevated today. She reports has been running 140-149/90. The patient reports no chest pain. The patient denies symptoms to suggest myocardial ischemia, heart failure or arrhythmia. The patient's PCP monitors cholesterol. Subjective  Cal Goncalves denies exertional chest pain, shortness of breath, orthopnea, paroxysmal nocturnal dyspnea, syncope, presyncope, sensed arrhythmia, edema and fatigue. The patient denies numbness or weakness to suggest cerebrovascular accident or transient ischemic attack.       Gemma Macias has the following history as recorded in [facility]:  Patient Active Problem List   Diagnosis Code    Hyperlipidemia E78.5    Family history of heart disease Z82.49    Murmur R01.1    Family history of heart disease Z82.49    Chest pain R07.9    SIGALA (dyspnea on exertion) R06.00    Type 2 diabetes mellitus without complication (HCC) D59.6    S/P AVR Z95.2    Coronary artery disease involving native coronary artery of native heart without angina pectoris I25.10    Essential hypertension I10    Postoperative atrial fibrillation (HCC) I97.89, I48.91    Palpitations R00.2    Mild mitral regurgitation I34.0    Mild tricuspid regurgitation I07.1    Valvular heart disease I38     Past Medical History:   Diagnosis Date    Arteriosclerotic heart disease (ASHD)     Coronary artery disease involving native coronary artery of native heart without angina pectoris     Family history of heart disease     father  37 MI, brother 46  MI, brother 61  MI, sister 58  MI    Hyperlipidemia     Hypertension     Mild mitral regurgitation 2016    Mild tricuspid regurgitation 2016    Moderate aortic stenosis 2016    s/p AVR, Hx: aortic valve replacement 3/29/16.      Murmur     Type 2 diabetes mellitus without complication (HCC)      Past Surgical History:   Procedure Laterality Date    AORTIC VALVE REPAIR N/A 3/29/2016    21-mm [employer]an [employer] bovine pericardial valve, model No. 3300TFX, serial No. [id number], [facility] [address] [facility] [address] ARGUETA 3 miscarriages     Family History   Problem Relation Age of Onset    Diabetes Mother     Other Father         thickness in blood    Heart Disease Father     Other Sister         thickness in blood    Diabetes Sister     Other Brother         thickness in blood    Other Paternal Uncle         thickness in blood    Other Paternal Grandfather         thickness in blood    Heart Disease Paternal Grandfather     Other Brother         thickness in blood    Diabetes Brother     Heart Disease Brother     Other Brother         thickness in blood    Other Brother         thickness in blood    Colon Cancer Neg Hx     Colon Polyps Neg Hx      Social History     Tobacco Use    Smoking status: Never Smoker    Smokeless tobacco: Never Used   Substance Use Topics    Alcohol use: No      Current Outpatient Medications   Medication Sig Dispense Refill    metoprolol succinate (TOPROL XL) 25 MG extended release tablet Take 1 tablet by mouth once daily 90 tablet 0    metFORMIN (GLUCOPHAGE-XR) 500 MG extended release tablet Take 1 tablet by mouth 2 times daily (with meals) 180 tablet 2    atorvastatin (LIPITOR) 40 MG tablet Take 1 tablet by mouth once daily 90 tablet 2    lisinopril (PRINIVIL;ZESTRIL) 20 MG tablet Take 20 mg by mouth daily      cloNIDine (CATAPRES) 0.1 MG tablet Take 1 tablet by mouth 3 times daily as needed for High Blood Pressure (BP greater than 170/90) 60 tablet 1    blood glucose monitor strips Brand: CONTOUR NEXT Test once daily & as needed for symptoms of irregular blood glucose. Dispense sufficient amount for indicated testing frequency plus additional to accommodate PRN testing needs. 100 strip 3    Lancets MISC 1 each by Does not apply route daily 100 each 3    triamcinolone (KENALOG) 0.025 % cream Apply Topically 1 Tube 0    Multiple Vitamins-Minerals (THERAPEUTIC MULTIVITAMIN-MINERALS) tablet Take 1 tablet by mouth daily      aspirin 81 MG tablet Take 81 mg by mouth nightly        No current facility-administered medications for this visit. Allergies: Corticosteroids and Niacin and related    Review of Systems  Constitutional  no appetite change, or unexpected weight change. No fever, chills or diaphoresis. No significant change in activity level or new onset of fatigue. HEENT  no significant rhinorrhea or epistaxis. No tinnitus or significant hearing loss. Eyes  no sudden vision change or amaurosis. No corneal arcus, xantholasma, subconjunctival hemorrhage or discharge. Respiratory  no significant wheezing, stridor, apnea or cough. No dyspnea on exertion or shortness of air. Cardiovascular  no exertional chest pain to suggest myocardial ischemia. No orthopnea or PND. No sensation of sustained arrythmia. No occurrence of slow heart rate. No palpitations. No claudication. Gastrointestinal  no abdominal swelling or pain. No blood in stool. No severe constipation, diarrhea, nausea, or vomiting. Genitourinary  no dysuria, frequency, or urgency. No flank pain or hematuria. Musculoskeletal  no back pain or myalgia. No problems with gait. Extremities - no clubbing, cyanosis or extremity edema. Skin  no color change or rash. No pallor. No new surgical incision. Neurologic  no speech difficulty, facial asymmetry or lateralizing weakness. No seizures, presyncope or syncope. No significant dizziness. Hematologic  no easy bruising or excessive bleeding. Psychiatric  no severe anxiety or insomnia. No confusion. All other review of systems are negative. Objective  Vital Signs - BP (!) 226/93   Pulse 73   Ht 5' 1.5\" (1.562 m)   Wt 194 lb (88 kg)   BMI 36.06 kg/m²   General - Miguel Severance is alert, cooperative, and pleasant. Well groomed. No acute distress. Body habitus - Body mass index is 36.06 kg/m². HEENT  Head is normocephalic. No circumoral cyanosis. Dentition is normal.  EYES -   Lids normal without ptosis. No discharge, edema or subconjunctival hemorrhage. Neck - Symmetrical without apparent mass or lymphadenopathy. Respiratory - Normal respiratory effort without use of accessory muscles. Ausculatation reveals vesicular breath sounds without crackles, wheezes, rub or rhonchi. Cardiovascular  No jugular venous distention. Auscultation reveals regular rate and rhythm. No audible clicks, gallop or rub. No murmur. No lower extremity varicosities. No carotid bruits. Abdominal -  No visible distention, mass or pulsations. Extremities - No clubbing or cyanosis. No statis dermatitis or ulcers. No edema. Musculoskeletal -   No Osler's nodes. No kyphosis or scoliosis. Gait is even and regular without limp or shuffle. Ambulates without assistance. Skin -  Warm and dry; no rash or pallor. No new surgical wound. Neurological - No focal neurological deficits. Thought processes coherent. No apparent tremor. Oriented to person, place and time. Psychiatric -  Appropriate affect and mood. Data reviewed:   Mitral valve leaflets are mildly thickened with preserved leaflet   mobility. Mild mitral regurgitation is present. Normal functioning bioprosthetic valve in the aortic position. Tricuspid valve is structurally normal.   Mild tricuspid regurgitation with estimated RVSP of 33 mm Hg. Normal size left atrium.    Normal left ventricular size with preserved LV function and an estimated  ejection fraction of approximately 55-60%. Normal left ventricular wall thickness. No regional wall motion abnormalities. Signature   Electronically signed by Jimi Grossman MD(Interpreting   physician) on 11/10/2020 04:56 PM    Lab Results   Component Value Date    WBC 8.4 03/15/2022    HGB 13.8 03/15/2022    HCT 43.5 03/15/2022    MCV 88.8 03/15/2022     03/15/2022     Lab Results   Component Value Date     03/15/2022    K 4.6 03/15/2022     03/15/2022    CO2 26 03/15/2022    BUN 18 03/15/2022    CREATININE 0.8 03/15/2022    GLUCOSE 92 03/15/2022    CALCIUM 10.3 (H) 03/15/2022    PROT 7.7 03/15/2022    LABALBU 4.3 03/15/2022    BILITOT 0.5 03/15/2022    ALKPHOS 73 03/15/2022    AST 21 03/15/2022    ALT 17 03/15/2022    LABGLOM >60 03/15/2022    GFRAA >59 03/15/2022    GLOB 3.5 12/30/2016       Lab Results   Component Value Date    CHOL 182 03/15/2022    CHOL 167 07/30/2020    CHOL 147 (L) 12/20/2019     Lab Results   Component Value Date    TRIG 161 (H) 03/15/2022    TRIG 186 (H) 07/30/2020    TRIG 151 (H) 12/20/2019     Lab Results   Component Value Date    HDL 45 (L) 03/15/2022    HDL 43 (L) 07/30/2020    HDL 42 (L) 12/20/2019     Lab Results   Component Value Date    LDLCALC 105 03/15/2022    LDLCALC 87 07/30/2020    LDLCALC 75 12/20/2019     BP Readings from Last 3 Encounters:   04/26/22 (!) 226/93   03/15/22 120/86   10/29/21 (!) 216/101    Pulse Readings from Last 3 Encounters:   04/26/22 73   03/15/22 68   10/29/21 72        Wt Readings from Last 3 Encounters:   04/26/22 194 lb (88 kg)   03/15/22 194 lb (88 kg)   10/29/21 192 lb (87.1 kg)       Assessment/Plan:      Stable CV status without overt heart failure, sensed arrhythmia or angina. CAD - stable on current medical management. Uncontrolled HTN - BP elevated today but very stressed.  She MENTIONED blood pressure is always better at home and worse during clinic visit She didn't blood pressure log from home     Will increase Lisinopril to 40 mg daily and increase Metoprolol to 50 mg daily      S/p AVR - 11/20 echo showed normal EF with mild MR and normally functioning bioprosthetic AVR. Follow with echo. Hyperlipidemia - LDL 87. Continues on Lipitor 40 mg daily. Patient is compliant with medication regimen. Previous cardiac history and records reviewed. Continue current medical management for cardiac related condition. Continue other current medications as directed. Continue to follow up with primary care provider for non cardiac medical problems. If your primary care provider is outside of the [facility], please request that your labs be faxed to this office at [phone]. BP goal 130/80 or less. Call the office with any problems, questions or concerns at [phone]. Cardiology follow up as scheduled in [address] appointments. Educational included in patient instructions. Heart health.        Jenifer Hickman MD, [facility]  Interventional Cardiologist, Endovascular Specialist   Medical Director, Structural Heart Program   [facility]

## 2022-05-31 DIAGNOSIS — I25.10 CORONARY ARTERY DISEASE INVOLVING NATIVE CORONARY ARTERY OF NATIVE HEART WITHOUT ANGINA PECTORIS: ICD-10-CM

## 2022-05-31 DIAGNOSIS — I10 ESSENTIAL HYPERTENSION: ICD-10-CM

## 2022-05-31 RX ORDER — LISINOPRIL 20 MG/1
TABLET ORAL
Qty: 180 TABLET | Refills: 0 | OUTPATIENT
Start: 2022-05-31

## 2022-05-31 RX ORDER — METOPROLOL SUCCINATE 25 MG/1
TABLET, EXTENDED RELEASE ORAL
Qty: 90 TABLET | Refills: 0 | OUTPATIENT
Start: 2022-05-31

## 2022-06-02 RX ORDER — METOPROLOL SUCCINATE 50 MG/1
50 TABLET, EXTENDED RELEASE ORAL DAILY
Qty: 30 TABLET | Refills: 5 | Status: SHIPPED | OUTPATIENT
Start: 2022-06-02

## 2022-06-02 RX ORDER — LISINOPRIL 40 MG/1
40 TABLET ORAL DAILY
Qty: 30 TABLET | Refills: 5 | Status: SHIPPED | OUTPATIENT
Start: 2022-06-02

## 2022-08-25 ENCOUNTER — TELEPHONE (OUTPATIENT)
Dept: PRIMARY CARE CLINIC | Age: 72
End: 2022-08-25

## 2022-08-25 NOTE — TELEPHONE ENCOUNTER
Left voicemail for return call to reschedule as provider will be out of the office. Advised to call [phone].

## 2022-09-07 DIAGNOSIS — E78.5 HYPERLIPIDEMIA, UNSPECIFIED HYPERLIPIDEMIA TYPE: ICD-10-CM

## 2022-09-07 DIAGNOSIS — E11.9 TYPE 2 DIABETES MELLITUS WITHOUT COMPLICATION, WITHOUT LONG-TERM CURRENT USE OF INSULIN (HCC): ICD-10-CM

## 2022-09-07 LAB
ALBUMIN SERPL-MCNC: 4.2 G/DL (ref 3.5–5.2)
ALP BLD-CCNC: 73 U/L (ref 35–104)
ALT SERPL-CCNC: 18 U/L (ref 5–33)
ANION GAP SERPL CALCULATED.3IONS-SCNC: 14 MMOL/L (ref 7–19)
AST SERPL-CCNC: 21 U/L (ref 5–32)
BASOPHILS ABSOLUTE: 0.1 K/UL (ref 0–0.2)
BASOPHILS RELATIVE PERCENT: 0.9 % (ref 0–1)
BILIRUB SERPL-MCNC: 0.4 MG/DL (ref 0.2–1.2)
BUN BLDV-MCNC: 16 MG/DL (ref 8–23)
CALCIUM SERPL-MCNC: 9.6 MG/DL (ref 8.8–10.2)
CHLORIDE BLD-SCNC: 101 MMOL/L (ref 98–111)
CHOLESTEROL, TOTAL: 147 MG/DL (ref 160–199)
CO2: 27 MMOL/L (ref 22–29)
CREAT SERPL-MCNC: 0.8 MG/DL (ref 0.5–0.9)
CREATININE URINE: 64.2 MG/DL (ref 4.2–622)
EOSINOPHILS ABSOLUTE: 0.3 K/UL (ref 0–0.6)
EOSINOPHILS RELATIVE PERCENT: 4 % (ref 0–5)
GFR AFRICAN AMERICAN: >59
GFR NON-AFRICAN AMERICAN: >60
GLUCOSE BLD-MCNC: 89 MG/DL (ref 74–109)
HBA1C MFR BLD: 5.7 % (ref 4–6)
HCT VFR BLD CALC: 40.8 % (ref 37–47)
HDLC SERPL-MCNC: 47 MG/DL (ref 65–121)
HEMOGLOBIN: 13 G/DL (ref 12–16)
IMMATURE GRANULOCYTES #: 0 K/UL
LDL CHOLESTEROL CALCULATED: 74 MG/DL
LYMPHOCYTES ABSOLUTE: 1.9 K/UL (ref 1.1–4.5)
LYMPHOCYTES RELATIVE PERCENT: 25.7 % (ref 20–40)
MCH RBC QN AUTO: 28.6 PG (ref 27–31)
MCHC RBC AUTO-ENTMCNC: 31.9 G/DL (ref 33–37)
MCV RBC AUTO: 89.7 FL (ref 81–99)
MICROALBUMIN UR-MCNC: <1.2 MG/DL (ref 0–19)
MICROALBUMIN/CREAT UR-RTO: NORMAL MG/G
MONOCYTES ABSOLUTE: 0.6 K/UL (ref 0–0.9)
MONOCYTES RELATIVE PERCENT: 7.8 % (ref 0–10)
NEUTROPHILS ABSOLUTE: 4.6 K/UL (ref 1.5–7.5)
NEUTROPHILS RELATIVE PERCENT: 61.3 % (ref 50–65)
PDW BLD-RTO: 13.4 % (ref 11.5–14.5)
PLATELET # BLD: 266 K/UL (ref 130–400)
PMV BLD AUTO: 9.5 FL (ref 9.4–12.3)
POTASSIUM SERPL-SCNC: 4.5 MMOL/L (ref 3.5–5)
RBC # BLD: 4.55 M/UL (ref 4.2–5.4)
SODIUM BLD-SCNC: 142 MMOL/L (ref 136–145)
TOTAL PROTEIN: 7.2 G/DL (ref 6.6–8.7)
TRIGL SERPL-MCNC: 131 MG/DL (ref 0–149)
TSH SERPL DL<=0.05 MIU/L-ACNC: 2.95 UIU/ML (ref 0.27–4.2)
WBC # BLD: 7.5 K/UL (ref 4.8–10.8)

## 2022-09-29 ENCOUNTER — OFFICE VISIT (OUTPATIENT)
Dept: PRIMARY CARE CLINIC | Age: 72
End: 2022-09-29
Payer: MEDICARE

## 2022-09-29 VITALS
HEIGHT: 62 IN | BODY MASS INDEX: 36.62 KG/M2 | HEART RATE: 66 BPM | DIASTOLIC BLOOD PRESSURE: 84 MMHG | TEMPERATURE: 97.9 F | OXYGEN SATURATION: 99 % | SYSTOLIC BLOOD PRESSURE: 146 MMHG | WEIGHT: 199 LBS

## 2022-09-29 DIAGNOSIS — I10 ESSENTIAL HYPERTENSION: Primary | ICD-10-CM

## 2022-09-29 DIAGNOSIS — E11.9 TYPE 2 DIABETES MELLITUS WITHOUT COMPLICATION, WITHOUT LONG-TERM CURRENT USE OF INSULIN (HCC): ICD-10-CM

## 2022-09-29 DIAGNOSIS — Z23 NEED FOR VACCINATION: ICD-10-CM

## 2022-09-29 DIAGNOSIS — E78.2 MIXED HYPERLIPIDEMIA: ICD-10-CM

## 2022-09-29 PROCEDURE — 1123F ACP DISCUSS/DSCN MKR DOCD: CPT | Performed by: NURSE PRACTITIONER

## 2022-09-29 PROCEDURE — 91312 COVID-19, PFIZER BIVALENT BOOSTER, (AGE 12Y+), IM, 30 MCG/0.3 ML: CPT | Performed by: NURSE PRACTITIONER

## 2022-09-29 PROCEDURE — G0008 ADMIN INFLUENZA VIRUS VAC: HCPCS | Performed by: NURSE PRACTITIONER

## 2022-09-29 PROCEDURE — 90694 VACC AIIV4 NO PRSRV 0.5ML IM: CPT | Performed by: NURSE PRACTITIONER

## 2022-09-29 PROCEDURE — 0124A COVID-19, PFIZER BIVALENT BOOSTER, (AGE 12Y+), IM, 30 MCG/0.3 ML: CPT | Performed by: NURSE PRACTITIONER

## 2022-09-29 PROCEDURE — 99214 OFFICE O/P EST MOD 30 MIN: CPT | Performed by: NURSE PRACTITIONER

## 2022-09-29 PROCEDURE — 3044F HG A1C LEVEL LT 7.0%: CPT | Performed by: NURSE PRACTITIONER

## 2022-09-29 RX ORDER — CLONIDINE HYDROCHLORIDE 0.1 MG/1
0.1 TABLET ORAL 3 TIMES DAILY PRN
Qty: 60 TABLET | Refills: 1 | Status: CANCELLED | OUTPATIENT
Start: 2022-09-29

## 2022-09-29 RX ORDER — CLONIDINE HYDROCHLORIDE 0.1 MG/1
0.1 TABLET ORAL 3 TIMES DAILY PRN
Qty: 60 TABLET | Refills: 1 | Status: SHIPPED | OUTPATIENT
Start: 2022-09-29

## 2022-09-29 RX ORDER — CLONIDINE HYDROCHLORIDE 0.1 MG/1
0.1 TABLET ORAL ONCE
Status: COMPLETED | OUTPATIENT
Start: 2022-09-29 | End: 2022-09-29

## 2022-09-29 RX ADMIN — CLONIDINE HYDROCHLORIDE 0.1 MG: 0.1 TABLET ORAL at 12:00

## 2022-09-29 ASSESSMENT — ENCOUNTER SYMPTOMS
RHINORRHEA: 0
PHOTOPHOBIA: 0
NAUSEA: 0
BACK PAIN: 0
SHORTNESS OF BREATH: 0
COLOR CHANGE: 0
COUGH: 0
VOMITING: 0
VOICE CHANGE: 0

## 2022-09-29 NOTE — PROGRESS NOTES
[address] [facility]  [address] [address] [place] [address] [place] [address]  Dept: [phone]  Dept Fax: [phone]  Loc: [phone]    Toribio Tipton is a 67 y.o. female who presents today for her medical conditions/complaints as noted below. Toribio Tipton is c/o of Follow-up (No new issues or concerns)        HPI:     HPI   Chief Complaint   Patient presents with    Follow-up     No new issues or concerns     Patient presents today for follow-up hypertension, hyperlipidemia, DM2. Today blood pressure is very elevated; initially MAs were getting readings 200s/110s. I rechecked with different cuff and was 165/105. 0.1 mg Clonidine given. Recheck after 15 min was 146/84. Patient states she tends to get anxious getting out of the house. It is not uncommon for it to be quite high when she is here. She states it is average at home. She is taking Lisinopril 40 mg and metoprolol 50 mg. She has follow-up with LULA Garrison on 10/27/22 for CAD. Past Medical History:   Diagnosis Date    Arteriosclerotic heart disease (ASHD)     Coronary artery disease involving native coronary artery of native heart without angina pectoris     Family history of heart disease     father  37 MI, brother 46  MI, brother 61  MI, sister 58  MI    Hyperlipidemia     Hypertension     Mild mitral regurgitation 2016    Mild tricuspid regurgitation 2016    Moderate aortic stenosis 2016    s/p AVR, Hx: aortic valve replacement 3/29/16.      Murmur     Type 2 diabetes mellitus without complication [facility])       Past Surgical History:   Procedure Laterality Date    AORTIC VALVE REPAIR N/A 3/29/2016    21-mm [employer]an [employer] bovine pericardial valve, model No. 3300TFX, serial No. [id number], Dr. Trace Duong      X 3 miscarriages       Vitals 2022 2022 2022 3/15/2022 10/29/2021    SYSTOLIC 561 835 457 454 261 458   DIASTOLIC 986 112 93 86 569 98   Site - Left Upper Arm - Left Upper Arm - -   Position - Sitting - Sitting - -   Cuff Size - Large Adult - - - -   Pulse - 66 73 68 72 -   Temp - 97.9 - 97.1 - -   SpO2 - 99 - 98 - -   Weight - 199 lb 194 lb 194 lb 192 lb -   Height - 5' 1.5\" 5' 1.5\" 5' 2\" 5' 1\" -   Body mass index - 36.99 kg/m2 36.06 kg/m2 35.48 kg/m2 36.27 kg/m2 -   Some recent data might be hidden       Family History   Problem Relation Age of Onset    Diabetes Mother     Other Father         thickness in blood    Heart Disease Father     Other Sister         thickness in blood    Diabetes Sister     Other Brother         thickness in blood    Other Paternal Uncle         thickness in blood    Other Paternal Grandfather         thickness in blood    Heart Disease Paternal Grandfather     Other Brother         thickness in blood    Diabetes Brother     Heart Disease Brother     Other Brother         thickness in blood    Other Brother         thickness in blood    Colon Cancer Neg Hx     Colon Polyps Neg Hx        Social History     Tobacco Use    Smoking status: Never    Smokeless tobacco: Never   Substance Use Topics    Alcohol use: No      Current Outpatient Medications on File Prior to Visit   Medication Sig Dispense Refill    lisinopril (PRINIVIL;ZESTRIL) 40 MG tablet Take 1 tablet by mouth daily 30 tablet 5    metoprolol succinate (TOPROL XL) 50 MG extended release tablet Take 1 tablet by mouth daily 30 tablet 5    metFORMIN (GLUCOPHAGE-XR) 500 MG extended release tablet Take 1 tablet by mouth 2 times daily (with meals) 180 tablet 2    atorvastatin (LIPITOR) 40 MG tablet Take 1 tablet by mouth once daily 90 tablet 2    blood glucose monitor strips Brand: CONTOUR NEXT Test once daily & as needed for symptoms of irregular blood glucose. Dispense sufficient amount for indicated testing frequency plus additional to accommodate PRN testing needs.  100 strip 3    Lancets MISC 1 each by Does not apply route daily 100 each 3    Multiple Vitamins-Minerals (THERAPEUTIC MULTIVITAMIN-MINERALS) tablet Take 1 tablet by mouth daily      aspirin 81 MG tablet Take 81 mg by mouth nightly       triamcinolone (KENALOG) 0.025 % cream Apply Topically (Patient not taking: Reported on 9/29/2022) 1 Tube 0     No current facility-administered medications on file prior to visit. Allergies   Allergen Reactions    Corticosteroids Palpitations and Other (See Comments)     Causes elevated blood pressure    Niacin And Related Palpitations and Other (See Comments)     Causes elevated blood pressure       Health Maintenance   Topic Date Due    Diabetic retinal exam  Never done    Shingles vaccine (2 of 3) 12/29/2014    Breast cancer screen  05/31/2019    Colorectal Cancer Screen  03/11/2020    Diabetic foot exam  06/13/2020    Flu vaccine (1) 08/01/2022    COVID-19 Vaccine (3 - Booster) 08/15/2022    Depression Screen  03/15/2023    Annual Wellness Visit (AWV)  03/16/2023    A1C test (Diabetic or Prediabetic)  09/07/2023    Diabetic microalbuminuria test  09/07/2023    Lipids  09/07/2023    DTaP/Tdap/Td vaccine (2 - Td or Tdap) 01/24/2028    DEXA (modify frequency per FRAX score)  Completed    Pneumococcal 65+ years Vaccine  Completed    Hepatitis C screen  Completed    Hepatitis A vaccine  Aged Out    Hib vaccine  Aged Out    Meningococcal (ACWY) vaccine  Aged Out       Subjective:      Review of Systems   Constitutional:  Negative for chills and fever. HENT:  Negative for ear pain, hearing loss, rhinorrhea and voice change. Eyes:  Negative for photophobia and visual disturbance. Respiratory:  Negative for cough and shortness of breath. Cardiovascular:  Negative for chest pain and palpitations. Gastrointestinal:  Negative for nausea and vomiting. Endocrine: Negative. Negative for cold intolerance and heat intolerance. Genitourinary:  Negative for difficulty urinating and flank pain.    Musculoskeletal:  Negative for back pain and neck pain. Skin:  Negative for color change and rash. Allergic/Immunologic: Negative for environmental allergies and food allergies. Neurological:  Negative for dizziness, speech difficulty and headaches. Hematological:  Does not bruise/bleed easily. Psychiatric/Behavioral:  Negative for sleep disturbance and suicidal ideas. Objective:     Physical Exam  Vitals and nursing note reviewed. Constitutional:       Appearance: She is well-developed. HENT:      Head: Atraumatic. Right Ear: External ear normal.      Left Ear: External ear normal.      Nose: Nose normal.   Eyes:      Conjunctiva/sclera: Conjunctivae normal.      Pupils: Pupils are equal, round, and reactive to light. Cardiovascular:      Rate and Rhythm: Normal rate and regular rhythm. Heart sounds: S1 normal and S2 normal. Murmur heard. Pulmonary:      Effort: Pulmonary effort is normal.      Breath sounds: Normal breath sounds. Abdominal:      General: Bowel sounds are normal.      Palpations: Abdomen is soft. Musculoskeletal:         General: Normal range of motion. Cervical back: Normal range of motion and neck supple. Skin:     General: Skin is warm and dry. Neurological:      Mental Status: She is alert and oriented to person, place, and time. Psychiatric:         Behavior: Behavior normal.     BP (!) 165/105   Pulse 66   Temp 97.9 °F (36.6 °C) (Temporal)   Ht 5' 1.5\" (1.562 m)   Wt 199 lb (90.3 kg)   SpO2 99%   BMI 36.99 kg/m²     Assessment:       Diagnosis Orders   1. Essential hypertension        2. Mixed hyperlipidemia        3. Type 2 diabetes mellitus without complication, without long-term current use of insulin ([facility]              Plan:   More than 50% of the time was spent counseling and coordinating care for a total time of 30 min face to face. Blood pressure improved with clonidine. Continue to monitor closely at home. Follow-up in 6 months with fasting labs.    Call for worsening symptoms. PDMP Monitoring:    Last PDMP Yevgeniy as Reviewed:  Review User Review Instant Review Result            Urine Drug Screenings (1 yr)    No resulted procedures found. Medication Contract and Consent for Opioid Use Documents Filed       Patient Documents       Type of Document Status Date Received Received By Description    Medication Contract Signed 12/3/2014 11:29 AM Sarah Knight                      Patient given educational materials -see patient instructions. Discussed use, benefit, and side effects of prescribed medications. All patient questions answered. Pt voiced understanding. Reviewed health maintenance. Instructed to continue currentmedications, diet and exercise. Patient agreed with treatment plan. Follow up as directed. MEDICATIONS:  Orders Placed This Encounter   Medications    cloNIDine (CATAPRES) 0.1 MG tablet     Sig: Take 1 tablet by mouth 3 times daily as needed for High Blood Pressure (BP greater than 170/90)     Dispense:  60 tablet     Refill:  1    cloNIDine (CATAPRES) tablet 0.1 mg         ORDERS:  Orders Placed This Encounter   Procedures    Influenza, FLUAD, (age 72 y+), IM, Preservative Free, 0.5 mL    COVID-19, MODERNA Bivalent BOOSTER, (age 18y+), IM, 50 mcg/0.5 mL         Follow-up:  No follow-ups on file. PATIENT INSTRUCTIONS:  There are no Patient Instructions on file for this visit. Electronically signed by LULA Sy on 9/29/2022 at 12:47 PM    EMR Dragon/transcription disclaimer:  Much of thisencounter note is electronic transcription/translation of spoken language to printed texts. The electronic translation of spoken language may be erroneous, or at times, nonsensical words or phrases may be inadvertentlytranscribed.   Although I have reviewed the note for such errors, some may still exist.

## 2022-11-01 ENCOUNTER — OFFICE VISIT (OUTPATIENT)
Dept: CARDIOLOGY CLINIC | Age: 72
End: 2022-11-01
Payer: MEDICARE

## 2022-11-01 VITALS
DIASTOLIC BLOOD PRESSURE: 78 MMHG | HEIGHT: 62 IN | HEART RATE: 58 BPM | BODY MASS INDEX: 37.17 KG/M2 | WEIGHT: 202 LBS | SYSTOLIC BLOOD PRESSURE: 126 MMHG

## 2022-11-01 DIAGNOSIS — I38 VALVULAR HEART DISEASE: Primary | ICD-10-CM

## 2022-11-01 DIAGNOSIS — Z95.2 S/P AVR: ICD-10-CM

## 2022-11-01 DIAGNOSIS — I10 ESSENTIAL HYPERTENSION: ICD-10-CM

## 2022-11-01 DIAGNOSIS — I25.10 CORONARY ARTERY DISEASE INVOLVING NATIVE CORONARY ARTERY OF NATIVE HEART WITHOUT ANGINA PECTORIS: ICD-10-CM

## 2022-11-01 DIAGNOSIS — E78.2 MIXED HYPERLIPIDEMIA: ICD-10-CM

## 2022-11-01 PROCEDURE — 99214 OFFICE O/P EST MOD 30 MIN: CPT | Performed by: CLINICAL NURSE SPECIALIST

## 2022-11-01 PROCEDURE — 1123F ACP DISCUSS/DSCN MKR DOCD: CPT | Performed by: CLINICAL NURSE SPECIALIST

## 2022-11-01 PROCEDURE — 3078F DIAST BP <80 MM HG: CPT | Performed by: CLINICAL NURSE SPECIALIST

## 2022-11-01 PROCEDURE — 93000 ELECTROCARDIOGRAM COMPLETE: CPT | Performed by: CLINICAL NURSE SPECIALIST

## 2022-11-01 PROCEDURE — 3074F SYST BP LT 130 MM HG: CPT | Performed by: CLINICAL NURSE SPECIALIST

## 2022-11-01 ASSESSMENT — ENCOUNTER SYMPTOMS
VOMITING: 0
EYE REDNESS: 0
WHEEZING: 0
NAUSEA: 0
SHORTNESS OF BREATH: 0
COUGH: 0
ABDOMINAL PAIN: 0
FACIAL SWELLING: 0
CHEST TIGHTNESS: 0

## 2022-11-01 NOTE — PATIENT INSTRUCTIONS
Return in about 6 months (around 5/1/2023) for APRN. Call for increasing palpitations  Sched Echo    [place] at the [address] [place] [address] and [address] [facility] located on the first floor of [place] [address] through hospital main entrance and turn immediately to your left. Date/Time:     Patient's contact number:  [phone] (home)     Echocardiogram -  No prep. Takes approximately 30 min. An echocardiogram uses sound waves to produce images of your heart. This commonly used test allows your doctor to see how your heart is beating and pumping blood. Your doctor can use the images from an echocardiogram to identify various abnormalities in the heart muscle and valves. This test has 2 parts: You will be asked to disrobe from the waist up and given a gown to wear. The technologist will then hook up an EKG monitor to you for the entire exam.   You will then have an ultrasound of your heart (echocardiogram) to assess the heart muscle, heart valves and heart function. You may eat and take any medicines before the exam.     If you need to change your appointment, please call outpatient scheduling at [phone].

## 2022-11-15 ENCOUNTER — HOSPITAL ENCOUNTER (OUTPATIENT)
Dept: NON INVASIVE DIAGNOSTICS | Age: 72
Discharge: HOME | End: 2022-11-15
Payer: MEDICARE

## 2022-11-15 DIAGNOSIS — Z95.2 S/P AVR: ICD-10-CM

## 2022-11-15 DIAGNOSIS — I38 VALVULAR HEART DISEASE: ICD-10-CM

## 2022-11-15 LAB
LV EF: 48 %
LVEF MODALITY: NORMAL

## 2022-11-15 PROCEDURE — 93306 TTE W/DOPPLER COMPLETE: CPT

## 2022-12-01 RX ORDER — LISINOPRIL 40 MG/1
40 TABLET ORAL DAILY
Qty: 90 TABLET | Refills: 2 | Status: SHIPPED | OUTPATIENT
Start: 2022-12-01

## 2022-12-05 ENCOUNTER — OFFICE VISIT (OUTPATIENT)
Dept: CARDIOLOGY CLINIC | Age: 72
End: 2022-12-05
Payer: MEDICARE

## 2022-12-05 VITALS
HEART RATE: 69 BPM | WEIGHT: 201 LBS | BODY MASS INDEX: 36.99 KG/M2 | DIASTOLIC BLOOD PRESSURE: 104 MMHG | SYSTOLIC BLOOD PRESSURE: 200 MMHG | HEIGHT: 62 IN

## 2022-12-05 DIAGNOSIS — I10 ESSENTIAL HYPERTENSION: ICD-10-CM

## 2022-12-05 DIAGNOSIS — I25.10 CORONARY ARTERY DISEASE INVOLVING NATIVE CORONARY ARTERY OF NATIVE HEART WITHOUT ANGINA PECTORIS: ICD-10-CM

## 2022-12-05 DIAGNOSIS — Z95.2 S/P AVR: Primary | ICD-10-CM

## 2022-12-05 DIAGNOSIS — E78.2 MIXED HYPERLIPIDEMIA: ICD-10-CM

## 2022-12-05 PROCEDURE — 1123F ACP DISCUSS/DSCN MKR DOCD: CPT | Performed by: INTERNAL MEDICINE

## 2022-12-05 PROCEDURE — 3078F DIAST BP <80 MM HG: CPT | Performed by: INTERNAL MEDICINE

## 2022-12-05 PROCEDURE — 99213 OFFICE O/P EST LOW 20 MIN: CPT | Performed by: INTERNAL MEDICINE

## 2022-12-05 PROCEDURE — 3074F SYST BP LT 130 MM HG: CPT | Performed by: INTERNAL MEDICINE

## 2022-12-05 ASSESSMENT — ENCOUNTER SYMPTOMS
NAUSEA: 0
RESPIRATORY NEGATIVE: 1
DIARRHEA: 0
SHORTNESS OF BREATH: 0
GASTROINTESTINAL NEGATIVE: 1
VOMITING: 0
EYES NEGATIVE: 1

## 2022-12-05 NOTE — PROGRESS NOTES
[facility] Progress Note                            Date:  12/5/2022  Patient: Luna Avila  Age:  67 y.o., 1950      Reason for evaluation:         SUBJECTIVE:    Seen today follow-up assessment previous surgical aortic valve replacement 2016. Cardiac catheter on 3/22/2016 reviewed moderate nonocclusive coronary artery disease of the LAD. Overall she is doing quite well. Denies exertional dyspnea or chest pain or palpitations no other complaints or issues reported. Recent echocardiogram 11/15/2022 normally functioning bioprosthetic valve in the aortic position mean gradient 15 mild TR RVSP 43 estimated ejection fraction 45 to 50%. Overall feels well. Blood pressure 200/104 heart rate 69. Review of Systems   Constitutional: Negative. Negative for chills, fever and unexpected weight change. HENT: Negative. Eyes: Negative. Respiratory: Negative. Negative for shortness of breath. Cardiovascular: Negative. Negative for chest pain. Gastrointestinal: Negative. Negative for diarrhea, nausea and vomiting. Endocrine: Negative. Genitourinary: Negative. Musculoskeletal: Negative. Skin: Negative. Neurological: Negative. All other systems reviewed and are negative. OBJECTIVE:     BP (!) 200/104   Pulse 69   Ht 5' 2\" (1.575 m)   Wt 201 lb (91.2 kg)   BMI 36.76 kg/m²     Labs:   CBC: No results for input(s): WBC, HGB, HCT, PLT in the last 72 hours. BMP:No results for input(s): NA, K, CO2, BUN, CREATININE, LABGLOM, GLUCOSE in the last 72 hours. BNP: No results for input(s): BNP in the last 72 hours. PT/INR: No results for input(s): PROTIME, INR in the last 72 hours. APTT:No results for input(s): APTT in the last 72 hours. CARDIAC ENZYMES:No results for input(s): CKTOTAL, CKMB, CKMBINDEX, TROPONINI in the last 72 hours.   FASTING LIPID PANEL:  Lab Results   Component Value Date/Time    HDL 47 09/07/2022 09:36 AM    LDLDIRECT 111 08/10/2015 07:33 AM [address] 74 2022 09:36 AM    TRIG 131 2022 09:36 AM     LIVER PROFILE:No results for input(s): AST, ALT, LABALBU in the last 72 hours. Past Medical History:   Diagnosis Date    Arteriosclerotic heart disease (ASHD)     Coronary artery disease involving native coronary artery of native heart without angina pectoris     Family history of heart disease     father  37 MI, brother 46  MI, brother 61  MI, sister 58  MI    Hyperlipidemia     Hypertension     Mild mitral regurgitation 2016    Mild tricuspid regurgitation 2016    Moderate aortic stenosis 2016    s/p AVR, Hx: aortic valve replacement 3/29/16.      Murmur     Type 2 diabetes mellitus without complication [facility])      Past Surgical History:   Procedure Laterality Date    AORTIC VALVE REPAIR N/A 3/29/2016    21-mm [employer] bovine pericardial valve, model No. 3300TFX, serial No. [id number], Dr. Marquita Fox      X 3 miscarriages     Family History   Problem Relation Age of Onset    Diabetes Mother     Other Father         thickness in blood    Heart Disease Father     Other Sister         thickness in blood    Diabetes Sister     Other Brother         thickness in blood    Other Paternal Uncle         thickness in blood    Other Paternal Grandfather         thickness in blood    Heart Disease Paternal Grandfather     Other Brother         thickness in blood    Diabetes Brother     Heart Disease Brother     Other Brother         thickness in blood    Other Brother         thickness in blood    Colon Cancer Neg Hx     Colon Polyps Neg Hx      Allergies   Allergen Reactions    Corticosteroids Palpitations and Other (See Comments)     Causes elevated blood pressure    Niacin And Related Palpitations and Other (See Comments)     Causes elevated blood pressure     Current Outpatient Medications   Medication Sig Dispense Refill    lisinopril (PRINIVIL;ZESTRIL) 40 MG tablet Take 1 tablet by mouth daily 90 tablet 2    cloNIDine (CATAPRES) 0.1 MG tablet Take 1 tablet by mouth 3 times daily as needed for High Blood Pressure (BP greater than 170/90) 60 tablet 1    metoprolol succinate (TOPROL XL) 50 MG extended release tablet Take 1 tablet by mouth daily 30 tablet 5    metFORMIN (GLUCOPHAGE-XR) 500 MG extended release tablet Take 1 tablet by mouth 2 times daily (with meals) 180 tablet 2    atorvastatin (LIPITOR) 40 MG tablet Take 1 tablet by mouth once daily 90 tablet 2    blood glucose monitor strips Brand: CONTOUR NEXT Test once daily & as needed for symptoms of irregular blood glucose. Dispense sufficient amount for indicated testing frequency plus additional to accommodate PRN testing needs. 100 strip 3    Lancets MISC 1 each by Does not apply route daily 100 each 3    Multiple Vitamins-Minerals (THERAPEUTIC MULTIVITAMIN-MINERALS) tablet Take 1 tablet by mouth daily      aspirin 81 MG tablet Take 81 mg by mouth nightly        No current facility-administered medications for this visit. Social History     Socioeconomic History    Marital status:       Spouse name: Not on file    Number of children: Not on file    Years of education: Not on file    Highest education level: Not on file   Occupational History    Not on file   Tobacco Use    Smoking status: Never    Smokeless tobacco: Never   Substance and Sexual Activity    Alcohol use: No    Drug use: No    Sexual activity: Not on file   Other Topics Concern    Not on file   Social History Narrative    Not on file     Social Determinants of Health     Financial Resource Strain: Low Risk     Difficulty of Paying Living Expenses: Not very hard   Food Insecurity: No Food Insecurity    Worried About Running Out of Food in the Last Year: Never true    Ran Out of Food in the Last Year: Never true   Transportation Needs: Not on file   Physical Activity: Not on file   Stress: Not on file   Social Connections: Not on file Intimate Partner Violence: Not on file   Housing Stability: Not on file       Physical Examination:  BP (!) 200/104   Pulse 69   Ht 5' 2\" (1.575 m)   Wt 201 lb (91.2 kg)   BMI 36.76 kg/m²   Physical Exam  Vitals reviewed. Constitutional:       Appearance: She is well-developed. Neck:      Vascular: No carotid bruit or JVD. Cardiovascular:      Rate and Rhythm: Normal rate and regular rhythm. Heart sounds: Murmur heard. Crescendo decrescendo systolic murmur is present with a grade of 2/6. No friction rub. No gallop. Comments: Doris Saldana best right upper sternal border  Pulmonary:      Effort: Pulmonary effort is normal. No respiratory distress. Breath sounds: Normal breath sounds. No wheezing or rales. Abdominal:      General: There is no distension. Tenderness: There is no abdominal tenderness. Lymphadenopathy:      Cervical: No cervical adenopathy. Skin:     General: Skin is warm and dry. ASSESSMENT:     Diagnosis Orders   1. S/P AVR        2. Coronary artery disease involving native coronary artery of native heart without angina pectoris        3. Mixed hyperlipidemia        4. Essential hypertension            PLAN:  No orders of the defined types were placed in this encounter. No orders of the defined types were placed in this encounter. Continue present medications  Needs to have blood pressure checked more frequently  Recommend follow-up assessment in 12 months    Return for return to Dr. Gayla Tidwell only. Kim Roman MD 12/5/2022 3:11 PM [facility]      Thisdictation was generated by voice recognition computer software. Although all attempts are made to edit the dictation for accuracy, there may be errors in the transcription that are not intended.

## 2022-12-20 RX ORDER — METOPROLOL SUCCINATE 50 MG/1
TABLET, EXTENDED RELEASE ORAL
Qty: 30 TABLET | Refills: 5 | Status: SHIPPED | OUTPATIENT
Start: 2022-12-20

## 2023-01-17 DIAGNOSIS — E11.9 TYPE 2 DIABETES MELLITUS WITHOUT COMPLICATION, WITHOUT LONG-TERM CURRENT USE OF INSULIN (HCC): ICD-10-CM

## 2023-01-17 DIAGNOSIS — E78.5 HYPERLIPIDEMIA, UNSPECIFIED HYPERLIPIDEMIA TYPE: ICD-10-CM

## 2023-01-18 RX ORDER — ATORVASTATIN CALCIUM 40 MG/1
TABLET, FILM COATED ORAL
Qty: 90 TABLET | Refills: 1 | Status: SHIPPED | OUTPATIENT
Start: 2023-01-18

## 2023-01-18 RX ORDER — METFORMIN HYDROCHLORIDE 500 MG/1
TABLET, EXTENDED RELEASE ORAL
Qty: 180 TABLET | Refills: 1 | Status: SHIPPED | OUTPATIENT
Start: 2023-01-18

## 2023-05-03 ENCOUNTER — OFFICE VISIT (OUTPATIENT)
Dept: CARDIOLOGY CLINIC | Age: 73
End: 2023-05-03
Payer: MEDICARE

## 2023-05-03 VITALS
SYSTOLIC BLOOD PRESSURE: 166 MMHG | WEIGHT: 211 LBS | OXYGEN SATURATION: 98 % | HEART RATE: 64 BPM | DIASTOLIC BLOOD PRESSURE: 96 MMHG | HEIGHT: 62 IN | BODY MASS INDEX: 38.83 KG/M2

## 2023-05-03 DIAGNOSIS — Z95.2 S/P AVR: ICD-10-CM

## 2023-05-03 DIAGNOSIS — I38 VALVULAR HEART DISEASE: Primary | ICD-10-CM

## 2023-05-03 DIAGNOSIS — I25.10 CORONARY ARTERY DISEASE INVOLVING NATIVE CORONARY ARTERY OF NATIVE HEART WITHOUT ANGINA PECTORIS: ICD-10-CM

## 2023-05-03 DIAGNOSIS — E78.2 MIXED HYPERLIPIDEMIA: ICD-10-CM

## 2023-05-03 DIAGNOSIS — I10 ESSENTIAL HYPERTENSION: ICD-10-CM

## 2023-05-03 PROCEDURE — 3077F SYST BP >= 140 MM HG: CPT | Performed by: CLINICAL NURSE SPECIALIST

## 2023-05-03 PROCEDURE — 3080F DIAST BP >= 90 MM HG: CPT | Performed by: CLINICAL NURSE SPECIALIST

## 2023-05-03 PROCEDURE — 1123F ACP DISCUSS/DSCN MKR DOCD: CPT | Performed by: CLINICAL NURSE SPECIALIST

## 2023-05-03 PROCEDURE — 99214 OFFICE O/P EST MOD 30 MIN: CPT | Performed by: CLINICAL NURSE SPECIALIST

## 2023-05-03 RX ORDER — HYDROCHLOROTHIAZIDE 12.5 MG/1
12.5 CAPSULE, GELATIN COATED ORAL EVERY MORNING
Qty: 90 CAPSULE | Refills: 3 | Status: SHIPPED | OUTPATIENT
Start: 2023-05-03

## 2023-05-03 ASSESSMENT — ENCOUNTER SYMPTOMS
WHEEZING: 0
COUGH: 0
ABDOMINAL PAIN: 0
CHEST TIGHTNESS: 0
VOMITING: 0
NAUSEA: 0
SHORTNESS OF BREATH: 1
FACIAL SWELLING: 0
EYE REDNESS: 0

## 2023-05-03 NOTE — PATIENT INSTRUCTIONS
Return in about 3 weeks (around 5/24/2023) for APRN. Start HCTZ 12.5mg daily  Monitor BP at home daily. Record readings and bring to next appt.

## 2023-05-03 NOTE — PROGRESS NOTES
AM    CO2 27 09/07/2022 09:36 AM    GLUCOSE 89 09/07/2022 09:36 AM    BUN 16 09/07/2022 09:36 AM    CREATININE 0.8 09/07/2022 09:36 AM    CALCIUM 9.6 09/07/2022 09:36 AM    ALT 18 09/07/2022 09:36 AM    AST 21 09/07/2022 09:36 AM     Lab Results   Component Value Date/Time    TSH 2.950 09/07/2022 09:36 AM     Echo 11/15/22   Conclusions      Summary   Mitral valve leaflets are mildly thickened with preserved leaflet   mobility. Mild mitral regurgitation is present. Normal functioning bioprosthetic valve in the aortic position. Mild aortic stenosis; no evidence of aortic regurgitation. Mean gradient 15 mm hg   Tricuspid valve is structurally normal.   Mild tricuspid regurgitation with estimated RVSP of 43 mm Hg    Echo 11/10/20  Conclusions      Summary   Mitral valve leaflets are mildly thickened with preserved leaflet   mobility. Mild mitral regurgitation is present. Normal functioning bioprosthetic valve in the aortic position. Tricuspid valve is structurally normal.   Mild tricuspid regurgitation with estimated RVSP of 33 mm Hg. Normal size left atrium. Normal left ventricular size with preserved LV function and an estimated   ejection fraction of approximately 55-60%. Normal left ventricular wall thickness. No regional wall motion abnormalities. Signature      ----------------------------------------------------------------   Electronically signed by Zachary Lawson MD(Interpreting   physician) on 11/10/2020 04:56 PM      Assessment:     Diagnosis Orders   1. Valvular heart disease        2. S/P AVR        3. Coronary artery disease involving native coronary artery of native heart without angina pectoris        4. Mixed hyperlipidemia        5. Essential hypertension            Valvular heart disease - stable, history of aortic valve replacement in 2016. Reviewed echo dated 11/22 that shows mild aortic stenosis. No changes, continue to monitor    CAD-nonobstructive disease.   No

## 2023-06-06 ENCOUNTER — OFFICE VISIT (OUTPATIENT)
Dept: CARDIOLOGY CLINIC | Age: 73
End: 2023-06-06
Payer: MEDICARE

## 2023-06-06 VITALS
DIASTOLIC BLOOD PRESSURE: 92 MMHG | HEIGHT: 61 IN | HEART RATE: 59 BPM | WEIGHT: 211 LBS | SYSTOLIC BLOOD PRESSURE: 162 MMHG | BODY MASS INDEX: 39.84 KG/M2

## 2023-06-06 DIAGNOSIS — I10 UNCONTROLLED HYPERTENSION: Primary | ICD-10-CM

## 2023-06-06 PROCEDURE — 3079F DIAST BP 80-89 MM HG: CPT | Performed by: CLINICAL NURSE SPECIALIST

## 2023-06-06 PROCEDURE — 3077F SYST BP >= 140 MM HG: CPT | Performed by: CLINICAL NURSE SPECIALIST

## 2023-06-06 PROCEDURE — 1123F ACP DISCUSS/DSCN MKR DOCD: CPT | Performed by: CLINICAL NURSE SPECIALIST

## 2023-06-06 PROCEDURE — 99213 OFFICE O/P EST LOW 20 MIN: CPT | Performed by: CLINICAL NURSE SPECIALIST

## 2023-06-06 RX ORDER — AMLODIPINE BESYLATE 5 MG/1
5 TABLET ORAL NIGHTLY
Qty: 30 TABLET | Refills: 5 | Status: SHIPPED | OUTPATIENT
Start: 2023-06-06

## 2023-06-06 ASSESSMENT — ENCOUNTER SYMPTOMS
FACIAL SWELLING: 0
NAUSEA: 0
EYE REDNESS: 0
SHORTNESS OF BREATH: 1
VOMITING: 0
ABDOMINAL PAIN: 0
WHEEZING: 0
CHEST TIGHTNESS: 0
COUGH: 0

## 2023-06-06 NOTE — PATIENT INSTRUCTIONS
Return in about 3 weeks (around 6/27/2023) for APRN. Add amlodipine 5mg at bedtime    Monitor BP at home daily. Record readings and bring to next appt.    Bring your BP cuff to next appt for comparison reading

## 2023-06-06 NOTE — PROGRESS NOTES
pressures from home and bring her blood pressure cuff to the next appointment for comparison reading    Stable cardiovascular status. No evidence of overt heart failure,angina or dysrhythmia. Plan    Return in about 3 weeks (around 6/27/2023) for LULA. Add amlodipine 5mg at bedtime    Monitor BP at home daily. Record readings and bring to next appt. Bring your BP cuff to next appt for comparison reading    Call with any questionsor concerns  Follow up with LULA Silva for non cardiac problems  Report any new problems  Cardiovascular Fitness-Exercise as tolerated. Strive for 15 minutes of exercise most days of the week. Cardiac / HealthyDiet  Continue current medications as directed  Continue plan of treatment  It is always recommended that you bring your medicationsbottles with you to each visit - this is for your safety!        LULA Taveras

## 2023-06-22 RX ORDER — METOPROLOL SUCCINATE 50 MG/1
TABLET, EXTENDED RELEASE ORAL
Qty: 30 TABLET | Refills: 5 | Status: SHIPPED | OUTPATIENT
Start: 2023-06-22

## 2023-07-11 ENCOUNTER — OFFICE VISIT (OUTPATIENT)
Dept: CARDIOLOGY CLINIC | Age: 73
End: 2023-07-11
Payer: MEDICARE

## 2023-07-11 VITALS
HEART RATE: 63 BPM | DIASTOLIC BLOOD PRESSURE: 80 MMHG | HEIGHT: 61 IN | BODY MASS INDEX: 40.4 KG/M2 | SYSTOLIC BLOOD PRESSURE: 122 MMHG | WEIGHT: 214 LBS

## 2023-07-11 DIAGNOSIS — I10 ESSENTIAL HYPERTENSION: Primary | ICD-10-CM

## 2023-07-11 PROCEDURE — 3078F DIAST BP <80 MM HG: CPT | Performed by: CLINICAL NURSE SPECIALIST

## 2023-07-11 PROCEDURE — 1123F ACP DISCUSS/DSCN MKR DOCD: CPT | Performed by: CLINICAL NURSE SPECIALIST

## 2023-07-11 PROCEDURE — 3074F SYST BP LT 130 MM HG: CPT | Performed by: CLINICAL NURSE SPECIALIST

## 2023-07-11 PROCEDURE — 99213 OFFICE O/P EST LOW 20 MIN: CPT | Performed by: CLINICAL NURSE SPECIALIST

## 2023-07-11 ASSESSMENT — ENCOUNTER SYMPTOMS
COUGH: 0
FACIAL SWELLING: 0
VOMITING: 0
SHORTNESS OF BREATH: 1
EYE REDNESS: 0
NAUSEA: 0
ABDOMINAL PAIN: 0
CHEST TIGHTNESS: 0
WHEEZING: 0

## 2023-07-11 NOTE — PROGRESS NOTES
CALCIUM 9.6 09/07/2022 09:36 AM    ALT 18 09/07/2022 09:36 AM    AST 21 09/07/2022 09:36 AM     Lab Results   Component Value Date/Time    TSH 2.950 09/07/2022 09:36 AM     Echo 11/15/22   Conclusions      Summary   Mitral valve leaflets are mildly thickened with preserved leaflet   mobility. Mild mitral regurgitation is present. Normal functioning bioprosthetic valve in the aortic position. Mild aortic stenosis; no evidence of aortic regurgitation. Mean gradient 15 mm hg   Tricuspid valve is structurally normal.   Mild tricuspid regurgitation with estimated RVSP of 43 mm Hg    Echo 11/10/20  Conclusions      Summary   Mitral valve leaflets are mildly thickened with preserved leaflet   mobility. Mild mitral regurgitation is present. Normal functioning bioprosthetic valve in the aortic position. Tricuspid valve is structurally normal.   Mild tricuspid regurgitation with estimated RVSP of 33 mm Hg. Normal size left atrium. Normal left ventricular size with preserved LV function and an estimated   ejection fraction of approximately 55-60%. Normal left ventricular wall thickness. No regional wall motion abnormalities. Signature      ----------------------------------------------------------------   Electronically signed by Ludmila Padilla MD(Interpreting   physician) on 11/10/2020 04:56 PM      Assessment:     Diagnosis Orders   1. Essential hypertension              Hypertension-improved control with recent addition of amlodipine and HCTZ. Tendency toward whitecoat hypertension. Continue present medication. Stable cardiovascular status. No evidence of overt heart failure,angina or dysrhythmia. Plan    Return for Dr. Era Vázquez, as scheduled. Continue amlodipine 5mg at bedtime      Call with any questionsor concerns  Follow up with LULA Castellon for non cardiac problems  Report any new problems  Cardiovascular Fitness-Exercise as tolerated.   Strive for 15 minutes of exercise

## 2023-07-24 DIAGNOSIS — E11.9 TYPE 2 DIABETES MELLITUS WITHOUT COMPLICATION, WITHOUT LONG-TERM CURRENT USE OF INSULIN (HCC): ICD-10-CM

## 2023-07-24 DIAGNOSIS — E78.5 HYPERLIPIDEMIA, UNSPECIFIED HYPERLIPIDEMIA TYPE: ICD-10-CM

## 2023-07-24 RX ORDER — ATORVASTATIN CALCIUM 40 MG/1
TABLET, FILM COATED ORAL
Qty: 90 TABLET | Refills: 0 | Status: SHIPPED | OUTPATIENT
Start: 2023-07-24

## 2023-07-24 RX ORDER — METFORMIN HYDROCHLORIDE 500 MG/1
TABLET, EXTENDED RELEASE ORAL
Qty: 180 TABLET | Refills: 0 | Status: SHIPPED | OUTPATIENT
Start: 2023-07-24

## 2023-08-04 NOTE — PROGRESS NOTES
Alice Chavez Patient Age: 34 year old  MESSAGE: Interpreting service used: No    Insurance on file confirmed with caller: Yes    IM/FP- Suture Removal-     Facility the sutures/staples were put in: [facility]    Date the sutures/staples were put in: 8.3.23    When the sutures/staples need to be removed: 10-12 days     Location of the sutures/staples on body: right ankle     # of Sutures/staples: 2    Is patient having new or worsening symptoms? No- Route to provider's clinical support pool    Message read back to caller for accuracy: Yes       ALLERGIES:  Amoxicillin, Sulfa antibiotics, Codeine, Estrogens, Prednisone, and Seasonal  Current Outpatient Medications   Medication Sig Dispense Refill   • nabumetone (RELAFEN) 750 MG tablet Take 1 tablet by mouth 2 times daily as needed for Pain. 20 tablet 0   • baclofen (LIORESAL) 10 MG tablet Take 1 tablet by mouth 3 times daily as needed (muscle spasm). quantity fifteen 15 tablet 0   • beclomethasone diprop HFA (Qvar RediHaler) 80 MCG/ACT inhaler Inhale 1 puff into the lungs daily. May step up to 2 puffs twice daily for 1-2 weeks at the onset of colds/illnesses. 3 each 1   • albuterol (ProAir HFA) 108 (90 Base) MCG/ACT inhaler Inhale 2 puffs into the lungs every 4 hours as needed for Shortness of Breath, Wheezing or Other (Cough). 1 each 0   • metoPROLOL tartrate (LOPRESSOR) 25 MG tablet Take 0.5 tablets by mouth 2 times daily as needed (palpitations). 30 tablet 6   • Multiple Vitamins-Minerals (MULTIVITAMIN ADULT PO) Take 1 tablet by mouth daily.      • Loratadine (CLARITIN PO) Take 10 mg by mouth daily. seasonal       No current facility-administered medications for this visit.     PHARMACY to use: see below        Pharmacy preference(s) on file:   [id number] - [facility] [address]  [address] [address]  Phone: [phone] Fax: [phone]      CALL BACK INFO: Ok to leave response (including medical  Medicare Annual Wellness Visit  Are Name: Michelle Severino Date: 2021   MRN: [id number] Sex: Female   Age: 69 y.o. Ethnicity: Non-/Non    : 1950 Race: Colton Harper is here for Medicare AWV (has not been able to get her flu or covid vaccine yet due to transportation)    Screenings for behavioral, psychosocial and functional/safety risks, and cognitive dysfunction are all negative except as indicated below. These results, as well as other patient data from the [address] form, are documented in Flowsheets linked to this Encounter. Allergies   Allergen Reactions    Corticosteroids Palpitations and Other (See Comments)     Causes elevated blood pressure    Niacin And Related Palpitations and Other (See Comments)     Causes elevated blood pressure         Prior to Visit Medications    Medication Sig Taking? Authorizing Provider   blood glucose test strips (EXACTECH TEST) strip Check blood sugar daily Yes LULA Morejon   Lancets MISC 1 each by Does not apply route daily Yes LULA Morejon   lisinopril (PRINIVIL;ZESTRIL) 20 MG tablet Take 1 tablet by mouth once daily Yes LULA Boyce   metFORMIN (GLUCOPHAGE-XR) 500 MG extended release tablet Take 1 tablet by mouth twice daily Yes LULA Morejon   metoprolol succinate (TOPROL XL) 25 MG extended release tablet Take 1 tablet by mouth daily Yes LULA Boyce   triamcinolone (KENALOG) 0.025 % cream Apply Topically Yes LULA Morejon   Lancets MISC 1 each by Does not apply route daily Dx:e11.9 Yes LULA Murillo   blood glucose test strips (ASCENSIA AUTODISC VI;ONE TOUCH ULTRA TEST VI) strip 1 each by In Vitro route daily As needed.  Dx.e11.9 Yes LULA Murillo   Multiple Vitamins-Minerals (THERAPEUTIC MULTIVITAMIN-MINERALS) tablet Take 1 tablet by mouth daily Yes Historical Provider, MD   aspirin 81 MG tablet Take 81 mg by mouth nightly  Yes Historical Provider, MD   atorvastatin (LIPITOR) 40 MG tablet Take 1 tablet by mouth daily  LULA Davila         Past Medical History:   Diagnosis Date    Arteriosclerotic heart disease (ASHD)     Coronary artery disease involving native coronary artery of native heart without angina pectoris     Family history of heart disease     father  37 MI, brother 46  MI, brother 61  MI, sister 58  MI    Hyperlipidemia     Hypertension     Mild mitral regurgitation 2016    Mild tricuspid regurgitation 2016    Moderate aortic stenosis 2016    s/p AVR, Hx: aortic valve replacement 3/29/16.  Murmur     Type 2 diabetes mellitus without complication [facility])        Past Surgical History:   Procedure Laterality Date    AORTIC VALVE REPAIR N/A 3/29/2016    21-mm [employer] bovine pericardial valve, model No. 3300TFX, serial No. [id number], Dr. Evelia Viveros      X 3 miscarriages         Family History   Problem Relation Age of Onset    Diabetes Mother     Other Father         thickness in blood    Heart Disease Father     Other Sister         thickness in blood    Diabetes Sister     Other Brother         thickness in blood    Other Paternal Uncle         thickness in blood    Other Paternal Grandfather         thickness in blood    Heart Disease Paternal Grandfather     Other Brother         thickness in blood    Diabetes Brother     Heart Disease Brother     Other Brother         thickness in blood    Other Brother         thickness in blood    Colon Cancer Neg Hx     Colon Polyps Neg Hx        CareTeam (Including outside providers/suppliers regularly involved in providing care):   Patient Care Team:  LULA Davila as PCP - General (Family Nurse Practitioner)  LULA Davila as PCP - [facility] Empaneled Provider  JENNIFER Rosales MD (Cardiology)    Wt Readings from Last 3 Encounters:   21 190 lb (86.2 kg) information) on answering machine      PCP: Emily Cobian DO         INS: Payor: [employer] / Plan: DAVID HUNT  / Product Type: [employer]   PATIENT ADDRESS:  [address] [place] [id number]     10/13/20 170 lb (77.1 kg)   07/30/20 189 lb (85.7 kg)      Patient-Reported Vitals 1/21/2021   Patient-Reported Systolic 204   Patient-Reported Diastolic 75   Patient-Reported Pulse 70      Body mass index is 35.32 kg/m². Based upon direct observation of the patient, evaluation of cognition reveals recent and remote memory intact. General Appearance: alert and oriented to person, place and time, well developed and well- nourished, in no acute distress  Neurologic: reflexes normal and symmetric, no cranial nerve deficit, gait, coordination and speech normal    Patient's complete Health Risk Assessment and screening values have been reviewed and are found in Flowsheets. The following problems were reviewed today and where indicated follow up appointments were made and/or referrals ordered. Positive Risk Factor Screenings with Interventions:            General Health and ACP:  General  In general, how would you say your health is?: Good  In the past 7 days, have you experienced any of the following?  New or Increased Pain, New or Increased Fatigue, Loneliness, Social Isolation, Stress or Anger?: (!) Stress  Do you get the social and emotional support that you need?: Yes  Do you have a Living Will?: (!) No  Advance Directives     Power of [address] [place] [address] Will ACP-Advance Directive ACP-Power of     Not on File Not on File Not on File Not on File      General Health Risk Interventions:  · No Living Will: Patient declines ACP discussion/assistance    Health Habits/Nutrition:  Health Habits/Nutrition  Do you exercise for at least 20 minutes 2-3 times per week?: Yes  Have you lost any weight without trying in the past 3 months?: No  Do you eat fewer than 2 meals per day?: No  Have you seen a dentist within the past year?: (!) No  Body mass index: (!) 35.31  Health Habits/Nutrition Interventions:  · Dental exam overdue:  patient encouraged to make appointment with his/her dentist    Hearing/Vision:  No exam data present  Hearing/Vision  Do you or your family notice any trouble with your hearing?: No  Do you have difficulty driving, watching TV, or doing any of your daily activities because of your eyesight?: (!) Yes  Have you had an eye exam within the past year?: (!) No  Hearing/Vision Interventions:  · Vision concerns:  patient encouraged to make appointment with his/her eye specialist    Safety:  Safety  Do you have working smoke detectors?: Yes  Have all throw rugs been removed or fastened?: (!) No  Do you have non-slip mats or surfaces in all bathtubs/showers?: Yes  Do all of your stairways have a railing or banister?: Yes  Are your doorways, halls and stairs free of clutter?: Yes  Do you always fasten your seatbelt when you are in a car?: Yes  Safety Interventions:  · Home safety tips provided     Personalized Preventive Plan   Current Health Maintenance Status  Immunization History   Administered Date(s) Administered    Influenza Virus Vaccine 11/03/2014    Influenza, High Dose (Fluzone 65 yrs and older) 01/24/2018, 11/26/2018    Influenza, Quadv, IM, PF (6 mo and older Fluzone, Flulaval, Fluarix, and 3 yrs and older Afluria) 12/13/2016    Influenza, Triv, inactivated, subunit, adjuvanted, IM (Fluad 65 yrs and older) 12/13/2019    Pneumococcal Conjugate 13-valent (Helon Chute) 01/05/2015, 11/26/2018    Pneumococcal Conjugate 7-valent (Prevnar7) 11/03/2014    Pneumococcal Conjugate Vaccine 12/01/2017    Pneumococcal Polysaccharide ([id number]) 01/06/2020    Tdap (Boostrix, Adacel) 01/24/2018    Zoster Live (Zostavax) 11/03/2014        Health Maintenance   Topic Date Due    Diabetic retinal exam  04/07/1959    COVID-19 Vaccine (1 of 2) 04/07/1965    Shingles Vaccine (2 of 3) 12/29/2014    Breast cancer screen  05/31/2019    Colon cancer screen colonoscopy  03/11/2020    Diabetic microalbuminuria test  05/08/2020    Diabetic foot exam  06/13/2020    Annual Wellness Visit (AWV)  07/30/2020    Flu vaccine (1) 09/01/2020    A1C test (Diabetic or Prediabetic)  07/30/2021    Lipid screen  07/30/2021    Potassium monitoring  07/30/2021    Creatinine monitoring  07/30/2021    DTaP/Tdap/Td vaccine (2 - Td) 01/24/2028    DEXA (modify frequency per FRAX score)  Completed    Pneumococcal 65+ years Vaccine  Completed    Hepatitis C screen  Completed    Hepatitis A vaccine  Aged Out    Hib vaccine  Aged Out    Meningococcal (ACWY) vaccine  Aged Out     Recommendations for [employer] Due: see orders and patient instructions/AVS.  . Recommended screening schedule for the next 5-10 years is provided to the patient in written form: see Patient Richard Leblanc was seen today for medicare aw. Diagnoses and all orders for this visit:    Routine general medical examination at a health care facility    Type 2 diabetes mellitus without complication, without long-term current use of insulin ([facility] [address]  - Blood sugars in stable range; continue metformin. Essential hypertension  - Blood pressure relatively stable; she watches this closely. Coronary artery disease involving native coronary artery of native heart without angina pectoris  - She is seeing cardiology every 6 months; she sees Dr Jeancarlos Lam.   - valvular heart disease; s/p aortic valve replacement 2016             Tracie Weber is a 69 y.o. female being evaluated by a Virtual Visit (phone) encounter to address concerns as mentioned above. A caregiver was present when appropriate. Due to this being a TeleHealth encounter ([facility]), evaluation of the following organ systems was limited: Vitals/Constitutional/EENT/Resp/CV/GI//MS/Neuro/Skin/Heme-Lymph-Imm.   Pursuant to the emergency declaration under the [address] waiver authority and the [employer] Act, this Virtual Visit was conducted with patient's (and/or legal guardian's) consent, to reduce the patient's risk of exposure to COVID-19 and provide necessary medical care. The patient (and/or legal guardian) has also been advised to contact this office for worsening conditions or problems, and seek emergency medical treatment and/or call 911 if deemed necessary. Patient identification was verified at the start of the visit: Yes    Services were provided through phone to substitute for in-person clinic visit. Patient and provider were located at their individual homes. --LULA Calvo on 1/21/2021 at 10:38 AM    An electronic signature was used to authenticate this note.

## 2023-09-19 RX ORDER — LISINOPRIL 40 MG/1
40 TABLET ORAL DAILY
Qty: 90 TABLET | Refills: 3 | Status: SHIPPED | OUTPATIENT
Start: 2023-09-19

## 2023-09-29 ENCOUNTER — TELEPHONE (OUTPATIENT)
Dept: CARDIOLOGY | Facility: CLINIC | Age: 73
End: 2023-09-29
Payer: MEDICARE

## 2023-09-29 NOTE — TELEPHONE ENCOUNTER
PATIENT IS NEEDING TO HAVE A CARDIAC RISK ASSESSMENT, FOR CATARACT SURGERY. Appt on 10/10/23. Form is scanned in.

## 2023-10-10 ENCOUNTER — OFFICE VISIT (OUTPATIENT)
Dept: PRIMARY CARE CLINIC | Age: 73
End: 2023-10-10
Payer: MEDICARE

## 2023-10-10 VITALS
SYSTOLIC BLOOD PRESSURE: 128 MMHG | HEART RATE: 60 BPM | BODY MASS INDEX: 40.55 KG/M2 | HEIGHT: 61 IN | OXYGEN SATURATION: 97 % | TEMPERATURE: 97 F | DIASTOLIC BLOOD PRESSURE: 80 MMHG | WEIGHT: 214.8 LBS

## 2023-10-10 DIAGNOSIS — I10 ESSENTIAL HYPERTENSION: ICD-10-CM

## 2023-10-10 DIAGNOSIS — E78.5 HYPERLIPIDEMIA, UNSPECIFIED HYPERLIPIDEMIA TYPE: ICD-10-CM

## 2023-10-10 DIAGNOSIS — E11.9 TYPE 2 DIABETES MELLITUS WITHOUT COMPLICATION, WITHOUT LONG-TERM CURRENT USE OF INSULIN (HCC): Primary | ICD-10-CM

## 2023-10-10 PROCEDURE — G0008 ADMIN INFLUENZA VIRUS VAC: HCPCS | Performed by: NURSE PRACTITIONER

## 2023-10-10 PROCEDURE — 3079F DIAST BP 80-89 MM HG: CPT | Performed by: NURSE PRACTITIONER

## 2023-10-10 PROCEDURE — 3074F SYST BP LT 130 MM HG: CPT | Performed by: NURSE PRACTITIONER

## 2023-10-10 PROCEDURE — 90694 VACC AIIV4 NO PRSRV 0.5ML IM: CPT | Performed by: NURSE PRACTITIONER

## 2023-10-10 PROCEDURE — 1123F ACP DISCUSS/DSCN MKR DOCD: CPT | Performed by: NURSE PRACTITIONER

## 2023-10-10 PROCEDURE — 99214 OFFICE O/P EST MOD 30 MIN: CPT | Performed by: NURSE PRACTITIONER

## 2023-10-10 ASSESSMENT — ENCOUNTER SYMPTOMS
BACK PAIN: 0
PHOTOPHOBIA: 0
COLOR CHANGE: 0
VOICE CHANGE: 0
NAUSEA: 0
VOMITING: 0
RHINORRHEA: 0
SHORTNESS OF BREATH: 0
COUGH: 0

## 2023-10-10 NOTE — PATIENT INSTRUCTIONS
Fasting labs in [address] within 1-2 weeks. Follow-up in 6 months for medicare annual wellness visit. Flu vaccine today.

## 2023-10-11 ENCOUNTER — TELEPHONE (OUTPATIENT)
Dept: CARDIOLOGY CLINIC | Age: 73
End: 2023-10-11

## 2023-10-11 NOTE — TELEPHONE ENCOUNTER
Date: TBD    Cardiologist: Dr. Katty Diehl    Procedure: Cataract sx     Surgeon: Dr. Keila Yuen     Last Office Visit: 7-11-23    Reason for office visit and medical concerns addressed at this office visit: HTN, CAD, A-fib, DM, hyperlipidemia     Testing Performed and Date of Service:  Echo 11-15-22    RCRI = 1 pt, low, 0.9%   METs 4    Current Medications: lisinopril, metformin, lipitor, toprol, norvasc, microzide, clonidine, ASA     Is the patient currently taking an anticoagulant? If so, what is the diagnosis the patient has been given to warrant the need for the anticoagulant?  ASA    Additional Notes: Cardiac Risk Request

## 2023-10-23 DIAGNOSIS — E78.5 HYPERLIPIDEMIA, UNSPECIFIED HYPERLIPIDEMIA TYPE: ICD-10-CM

## 2023-10-23 DIAGNOSIS — E11.9 TYPE 2 DIABETES MELLITUS WITHOUT COMPLICATION, WITHOUT LONG-TERM CURRENT USE OF INSULIN (HCC): ICD-10-CM

## 2023-10-24 RX ORDER — METFORMIN HYDROCHLORIDE 500 MG/1
TABLET, EXTENDED RELEASE ORAL
Qty: 180 TABLET | Refills: 0 | Status: SHIPPED | OUTPATIENT
Start: 2023-10-24

## 2023-10-24 RX ORDER — ATORVASTATIN CALCIUM 40 MG/1
TABLET, FILM COATED ORAL
Qty: 90 TABLET | Refills: 0 | Status: SHIPPED | OUTPATIENT
Start: 2023-10-24

## 2023-11-16 RX ORDER — AMLODIPINE BESYLATE 5 MG/1
5 TABLET ORAL NIGHTLY
Qty: 30 TABLET | Refills: 5 | Status: SHIPPED | OUTPATIENT
Start: 2023-11-16

## 2023-12-27 RX ORDER — METOPROLOL SUCCINATE 50 MG/1
TABLET, EXTENDED RELEASE ORAL
Qty: 30 TABLET | Refills: 4 | Status: SHIPPED | OUTPATIENT
Start: 2023-12-27

## 2024-01-28 DIAGNOSIS — E78.5 HYPERLIPIDEMIA, UNSPECIFIED HYPERLIPIDEMIA TYPE: ICD-10-CM

## 2024-01-28 DIAGNOSIS — E11.9 TYPE 2 DIABETES MELLITUS WITHOUT COMPLICATION, WITHOUT LONG-TERM CURRENT USE OF INSULIN (HCC): ICD-10-CM

## 2024-01-29 RX ORDER — ATORVASTATIN CALCIUM 40 MG/1
TABLET, FILM COATED ORAL
Qty: 90 TABLET | Refills: 0 | Status: SHIPPED | OUTPATIENT
Start: 2024-01-29

## 2024-01-29 RX ORDER — METFORMIN HYDROCHLORIDE 500 MG/1
TABLET, EXTENDED RELEASE ORAL
Qty: 180 TABLET | Refills: 0 | Status: SHIPPED | OUTPATIENT
Start: 2024-01-29

## 2024-04-15 ENCOUNTER — OFFICE VISIT (OUTPATIENT)
Dept: PRIMARY CARE CLINIC | Age: 74
End: 2024-04-15
Payer: MEDICARE

## 2024-04-15 VITALS
BODY MASS INDEX: 43.23 KG/M2 | RESPIRATION RATE: 18 BRPM | DIASTOLIC BLOOD PRESSURE: 72 MMHG | OXYGEN SATURATION: 97 % | HEART RATE: 62 BPM | SYSTOLIC BLOOD PRESSURE: 124 MMHG | WEIGHT: 229 LBS | TEMPERATURE: 97 F | HEIGHT: 61 IN

## 2024-04-15 DIAGNOSIS — E66.01 OBESITY, CLASS III, BMI 40-49.9 (MORBID OBESITY) (HCC): ICD-10-CM

## 2024-04-15 DIAGNOSIS — I97.89 POSTOPERATIVE ATRIAL FIBRILLATION (HCC): ICD-10-CM

## 2024-04-15 DIAGNOSIS — Z00.00 MEDICARE ANNUAL WELLNESS VISIT, SUBSEQUENT: Primary | ICD-10-CM

## 2024-04-15 DIAGNOSIS — Z12.11 COLON CANCER SCREENING: ICD-10-CM

## 2024-04-15 DIAGNOSIS — I50.22 CHRONIC SYSTOLIC (CONGESTIVE) HEART FAILURE (HCC): ICD-10-CM

## 2024-04-15 DIAGNOSIS — I10 ESSENTIAL HYPERTENSION: ICD-10-CM

## 2024-04-15 DIAGNOSIS — I48.91 POSTOPERATIVE ATRIAL FIBRILLATION (HCC): ICD-10-CM

## 2024-04-15 DIAGNOSIS — E11.9 TYPE 2 DIABETES MELLITUS WITHOUT COMPLICATION, WITHOUT LONG-TERM CURRENT USE OF INSULIN (HCC): ICD-10-CM

## 2024-04-15 PROCEDURE — 1123F ACP DISCUSS/DSCN MKR DOCD: CPT | Performed by: NURSE PRACTITIONER

## 2024-04-15 PROCEDURE — G0439 PPPS, SUBSEQ VISIT: HCPCS | Performed by: NURSE PRACTITIONER

## 2024-04-15 PROCEDURE — 3078F DIAST BP <80 MM HG: CPT | Performed by: NURSE PRACTITIONER

## 2024-04-15 PROCEDURE — 3074F SYST BP LT 130 MM HG: CPT | Performed by: NURSE PRACTITIONER

## 2024-04-15 RX ORDER — BLOOD-GLUCOSE METER
1 KIT MISCELLANEOUS DAILY
Qty: 1 KIT | Refills: 0 | Status: SHIPPED | OUTPATIENT
Start: 2024-04-15

## 2024-04-15 RX ORDER — LANCETS 30 GAUGE
1 EACH MISCELLANEOUS DAILY
Qty: 100 EACH | Refills: 3 | Status: SHIPPED | OUTPATIENT
Start: 2024-04-15

## 2024-04-15 RX ORDER — GLUCOSAMINE HCL/CHONDROITIN SU 500-400 MG
CAPSULE ORAL
Qty: 100 STRIP | Refills: 0 | Status: SHIPPED | OUTPATIENT
Start: 2024-04-15

## 2024-04-15 SDOH — ECONOMIC STABILITY: FOOD INSECURITY: WITHIN THE PAST 12 MONTHS, YOU WORRIED THAT YOUR FOOD WOULD RUN OUT BEFORE YOU GOT MONEY TO BUY MORE.: SOMETIMES TRUE

## 2024-04-15 SDOH — ECONOMIC STABILITY: HOUSING INSECURITY
IN THE LAST 12 MONTHS, WAS THERE A TIME WHEN YOU DID NOT HAVE A STEADY PLACE TO SLEEP OR SLEPT IN A SHELTER (INCLUDING NOW)?: NO

## 2024-04-15 SDOH — ECONOMIC STABILITY: FOOD INSECURITY: WITHIN THE PAST 12 MONTHS, THE FOOD YOU BOUGHT JUST DIDN'T LAST AND YOU DIDN'T HAVE MONEY TO GET MORE.: SOMETIMES TRUE

## 2024-04-15 SDOH — ECONOMIC STABILITY: INCOME INSECURITY: HOW HARD IS IT FOR YOU TO PAY FOR THE VERY BASICS LIKE FOOD, HOUSING, MEDICAL CARE, AND HEATING?: NOT VERY HARD

## 2024-04-15 ASSESSMENT — PATIENT HEALTH QUESTIONNAIRE - PHQ9
SUM OF ALL RESPONSES TO PHQ QUESTIONS 1-9: 2
1. LITTLE INTEREST OR PLEASURE IN DOING THINGS: SEVERAL DAYS
SUM OF ALL RESPONSES TO PHQ QUESTIONS 1-9: 2
SUM OF ALL RESPONSES TO PHQ9 QUESTIONS 1 & 2: 2
2. FEELING DOWN, DEPRESSED OR HOPELESS: SEVERAL DAYS
SUM OF ALL RESPONSES TO PHQ QUESTIONS 1-9: 2
SUM OF ALL RESPONSES TO PHQ QUESTIONS 1-9: 2

## 2024-04-15 ASSESSMENT — LIFESTYLE VARIABLES
HOW MANY STANDARD DRINKS CONTAINING ALCOHOL DO YOU HAVE ON A TYPICAL DAY: PATIENT DOES NOT DRINK
HOW OFTEN DO YOU HAVE A DRINK CONTAINING ALCOHOL: NEVER

## 2024-04-15 NOTE — PROGRESS NOTES
HR=71 bpm, JKPY=157/80 mmhg, Pain=0, Rodriguez=2, Comment=sinus Medicare Annual Wellness Visit    Marcia Ponce is here for Medicare AWV    Assessment & Plan   Medicare annual wellness visit, subsequent  Type 2 diabetes mellitus without complication, without long-term current use of insulin (HCC)  -     Vitamin D 25 Hydroxy; Future  -     Lipid Panel; Future  -     Hemoglobin A1C; Future  -     Comprehensive Metabolic Panel; Future  -     TSH; Future  -     CBC with Auto Differential; Future  Obesity, Class III, BMI 40-49.9 (morbid obesity) (HCC)  -     Vitamin D 25 Hydroxy; Future  -     Lipid Panel; Future  -     Hemoglobin A1C; Future  -     Comprehensive Metabolic Panel; Future  -     TSH; Future  -     CBC with Auto Differential; Future  Postoperative atrial fibrillation (HCC)  -     Vitamin D 25 Hydroxy; Future  -     Lipid Panel; Future  -     Hemoglobin A1C; Future  -     Comprehensive Metabolic Panel; Future  -     TSH; Future  -     CBC with Auto Differential; Future  Chronic systolic (congestive) heart failure  -     Vitamin D 25 Hydroxy; Future  -     Lipid Panel; Future  -     Hemoglobin A1C; Future  -     Comprehensive Metabolic Panel; Future  -     TSH; Future  -     CBC with Auto Differential; Future  Essential hypertension  -     Vitamin D 25 Hydroxy; Future  -     Lipid Panel; Future  -     Hemoglobin A1C; Future  -     Comprehensive Metabolic Panel; Future  -     TSH; Future  -     CBC with Auto Differential; Future  Colon cancer screening  -     Mary Fernández APRN, Gastroenterology, [place]    Recommendations for Preventive Services Due: see orders and patient instructions/AVS.  Recommended screening schedule for the next 5-10 years is provided to the patient in written form: see Patient Instructions/AVS.     Return in about 6 months (around 10/15/2024) for in office, follow-up with labs.     Subjective   The following acute and/or chronic problems were also addressed today:    Patient is due for for colonoscopy; history of polyps. Agreeable for

## 2024-04-15 NOTE — PATIENT INSTRUCTIONS

## 2024-04-29 DIAGNOSIS — E11.9 TYPE 2 DIABETES MELLITUS WITHOUT COMPLICATION, WITHOUT LONG-TERM CURRENT USE OF INSULIN (HCC): ICD-10-CM

## 2024-04-29 DIAGNOSIS — E78.5 HYPERLIPIDEMIA, UNSPECIFIED HYPERLIPIDEMIA TYPE: ICD-10-CM

## 2024-04-29 RX ORDER — METFORMIN HYDROCHLORIDE 500 MG/1
TABLET, EXTENDED RELEASE ORAL
Qty: 180 TABLET | Refills: 0 | Status: SHIPPED | OUTPATIENT
Start: 2024-04-29

## 2024-04-29 RX ORDER — ATORVASTATIN CALCIUM 40 MG/1
TABLET, FILM COATED ORAL
Qty: 90 TABLET | Refills: 0 | Status: SHIPPED | OUTPATIENT
Start: 2024-04-29

## 2024-05-14 RX ORDER — HYDROCHLOROTHIAZIDE 12.5 MG/1
CAPSULE, GELATIN COATED ORAL
Qty: 90 CAPSULE | Refills: 1 | Status: SHIPPED | OUTPATIENT
Start: 2024-05-14

## 2024-05-21 RX ORDER — METOPROLOL SUCCINATE 50 MG/1
TABLET, EXTENDED RELEASE ORAL
Qty: 90 TABLET | Refills: 1 | Status: SHIPPED | OUTPATIENT
Start: 2024-05-21

## 2024-05-22 DIAGNOSIS — E11.9 TYPE 2 DIABETES MELLITUS WITHOUT COMPLICATION, WITHOUT LONG-TERM CURRENT USE OF INSULIN (HCC): ICD-10-CM

## 2024-05-22 DIAGNOSIS — E66.01 OBESITY, CLASS III, BMI 40-49.9 (MORBID OBESITY) (HCC): ICD-10-CM

## 2024-05-22 DIAGNOSIS — I50.22 CHRONIC SYSTOLIC (CONGESTIVE) HEART FAILURE (HCC): ICD-10-CM

## 2024-05-22 DIAGNOSIS — I10 ESSENTIAL HYPERTENSION: ICD-10-CM

## 2024-05-22 DIAGNOSIS — I97.89 POSTOPERATIVE ATRIAL FIBRILLATION (HCC): ICD-10-CM

## 2024-05-22 DIAGNOSIS — I48.91 POSTOPERATIVE ATRIAL FIBRILLATION (HCC): ICD-10-CM

## 2024-05-22 LAB
25(OH)D3 SERPL-MCNC: 41.4 NG/ML
ALBUMIN SERPL-MCNC: 4 G/DL (ref 3.5–5.2)
ALP SERPL-CCNC: 81 U/L (ref 35–104)
ALT SERPL-CCNC: 15 U/L (ref 5–33)
ANION GAP SERPL CALCULATED.3IONS-SCNC: 17 MMOL/L (ref 7–19)
AST SERPL-CCNC: 19 U/L (ref 5–32)
BASOPHILS # BLD: 0.1 K/UL (ref 0–0.2)
BASOPHILS NFR BLD: 1 % (ref 0–1)
BILIRUB SERPL-MCNC: 0.4 MG/DL (ref 0.2–1.2)
BUN SERPL-MCNC: 22 MG/DL (ref 8–23)
CALCIUM SERPL-MCNC: 9.6 MG/DL (ref 8.8–10.2)
CHLORIDE SERPL-SCNC: 101 MMOL/L (ref 98–111)
CHOLEST SERPL-MCNC: 167 MG/DL (ref 160–199)
CO2 SERPL-SCNC: 25 MMOL/L (ref 22–29)
CREAT SERPL-MCNC: 1 MG/DL (ref 0.5–0.9)
EOSINOPHIL # BLD: 0.2 K/UL (ref 0–0.6)
EOSINOPHIL NFR BLD: 3.4 % (ref 0–5)
ERYTHROCYTE [DISTWIDTH] IN BLOOD BY AUTOMATED COUNT: 13.5 % (ref 11.5–14.5)
GLUCOSE SERPL-MCNC: 93 MG/DL (ref 74–109)
HBA1C MFR BLD: 6.2 % (ref 4–6)
HCT VFR BLD AUTO: 36.9 % (ref 37–47)
HDLC SERPL-MCNC: 35 MG/DL (ref 65–121)
HGB BLD-MCNC: 12.1 G/DL (ref 12–16)
IMM GRANULOCYTES # BLD: 0 K/UL
LDLC SERPL CALC-MCNC: 86 MG/DL
LYMPHOCYTES # BLD: 1.4 K/UL (ref 1.1–4.5)
LYMPHOCYTES NFR BLD: 20.5 % (ref 20–40)
MCH RBC QN AUTO: 29.2 PG (ref 27–31)
MCHC RBC AUTO-ENTMCNC: 32.8 G/DL (ref 33–37)
MCV RBC AUTO: 88.9 FL (ref 81–99)
MONOCYTES # BLD: 0.5 K/UL (ref 0–0.9)
MONOCYTES NFR BLD: 7 % (ref 0–10)
NEUTROPHILS # BLD: 4.8 K/UL (ref 1.5–7.5)
NEUTS SEG NFR BLD: 67.8 % (ref 50–65)
PLATELET # BLD AUTO: 285 K/UL (ref 130–400)
PMV BLD AUTO: 9.4 FL (ref 9.4–12.3)
POTASSIUM SERPL-SCNC: 4.1 MMOL/L (ref 3.5–5)
PROT SERPL-MCNC: 7.3 G/DL (ref 6.6–8.7)
RBC # BLD AUTO: 4.15 M/UL (ref 4.2–5.4)
SODIUM SERPL-SCNC: 143 MMOL/L (ref 136–145)
TRIGL SERPL-MCNC: 228 MG/DL (ref 0–149)
TSH SERPL DL<=0.005 MIU/L-ACNC: 2.91 UIU/ML (ref 0.27–4.2)
WBC # BLD AUTO: 7 K/UL (ref 4.8–10.8)

## 2024-05-30 RX ORDER — AMLODIPINE BESYLATE 5 MG/1
5 TABLET ORAL NIGHTLY
Qty: 30 TABLET | Refills: 0 | Status: SHIPPED | OUTPATIENT
Start: 2024-05-30

## 2024-06-10 ENCOUNTER — OFFICE VISIT (OUTPATIENT)
Dept: CARDIOLOGY CLINIC | Age: 74
End: 2024-06-10
Payer: MEDICARE

## 2024-06-10 VITALS
DIASTOLIC BLOOD PRESSURE: 80 MMHG | SYSTOLIC BLOOD PRESSURE: 126 MMHG | HEIGHT: 62 IN | BODY MASS INDEX: 40.12 KG/M2 | WEIGHT: 218 LBS | HEART RATE: 71 BPM

## 2024-06-10 DIAGNOSIS — E78.2 MIXED HYPERLIPIDEMIA: ICD-10-CM

## 2024-06-10 DIAGNOSIS — I38 VALVULAR HEART DISEASE: ICD-10-CM

## 2024-06-10 DIAGNOSIS — Z95.2 S/P AVR: ICD-10-CM

## 2024-06-10 DIAGNOSIS — I10 ESSENTIAL HYPERTENSION: Primary | ICD-10-CM

## 2024-06-10 DIAGNOSIS — I25.10 CORONARY ARTERY DISEASE INVOLVING NATIVE CORONARY ARTERY OF NATIVE HEART WITHOUT ANGINA PECTORIS: ICD-10-CM

## 2024-06-10 PROCEDURE — 99213 OFFICE O/P EST LOW 20 MIN: CPT | Performed by: INTERNAL MEDICINE

## 2024-06-10 PROCEDURE — 3074F SYST BP LT 130 MM HG: CPT | Performed by: INTERNAL MEDICINE

## 2024-06-10 PROCEDURE — 3079F DIAST BP 80-89 MM HG: CPT | Performed by: INTERNAL MEDICINE

## 2024-06-10 PROCEDURE — 1123F ACP DISCUSS/DSCN MKR DOCD: CPT | Performed by: INTERNAL MEDICINE

## 2024-06-10 ASSESSMENT — ENCOUNTER SYMPTOMS
EYES NEGATIVE: 1
NAUSEA: 0
VOMITING: 0
SHORTNESS OF BREATH: 0
GASTROINTESTINAL NEGATIVE: 1
DIARRHEA: 0
RESPIRATORY NEGATIVE: 1

## 2024-06-10 NOTE — PROGRESS NOTES
[facility]                            Date:  6/10/2024  Patient: Marcia Ponce  Age:  74 y.o., 1950      Reason for evaluation:         SUBJECTIVE:    Returns today for follow-up assessment followed up for bioprosthetic aortic valve coronary artery disease hyperlipidemia hypertension overall doing well.  Denies excessive dyspnea denies chest discomfort palpitations or other complaints.  Blood pressure 126/80 heart 71.    Review of Systems   Constitutional: Negative.  Negative for chills, fever and unexpected weight change.   HENT: Negative.     Eyes: Negative.    Respiratory: Negative.  Negative for shortness of breath.    Cardiovascular: Negative.  Negative for chest pain.   Gastrointestinal: Negative.  Negative for diarrhea, nausea and vomiting.   Endocrine: Negative.    Genitourinary: Negative.    Musculoskeletal: Negative.    Skin: Negative.    Neurological: Negative.    All other systems reviewed and are negative.        OBJECTIVE:    /80   Pulse 71   Ht 1.562 m (5' 1.5\")   Wt 98.9 kg (218 lb)   BMI 40.52 kg/m²     Labs:   CBC: No results for input(s): \"WBC\", \"HGB\", \"HCT\", \"PLT\" in the last 72 hours.  BMP:No results for input(s): \"NA\", \"K\", \"CO2\", \"BUN\", \"CREATININE\", \"LABGLOM\", \"GLUCOSE\" in the last 72 hours.  BNP: No results for input(s): \"BNP\" in the last 72 hours.  PT/INR: No results for input(s): \"PROTIME\", \"INR\" in the last 72 hours.  APTT:No results for input(s): \"APTT\" in the last 72 hours.  CARDIAC ENZYMES:No results for input(s): \"CKTOTAL\", \"CKMB\", \"CKMBINDEX\", \"TROPONINI\" in the last 72 hours.  FASTING LIPID PANEL:  Lab Results   Component Value Date/Time    HDL 35 05/22/2024 09:41 AM    LDLDIRECT 111 08/10/2015 07:33 AM    TRIG 228 05/22/2024 09:41 AM     LIVER PROFILE:No results for input(s): \"AST\", \"ALT\", \"LABALBU\" in the last 72 hours.        Past Medical History:   Diagnosis Date    Arteriosclerotic heart disease (ASHD)     Coronary artery disease

## 2024-06-25 RX ORDER — AMLODIPINE BESYLATE 5 MG/1
5 TABLET ORAL NIGHTLY
Qty: 30 TABLET | Refills: 5 | Status: SHIPPED | OUTPATIENT
Start: 2024-06-25

## 2024-07-08 DIAGNOSIS — E78.5 HYPERLIPIDEMIA, UNSPECIFIED HYPERLIPIDEMIA TYPE: ICD-10-CM

## 2024-07-10 ENCOUNTER — TELEPHONE (OUTPATIENT)
Dept: GASTROENTEROLOGY | Age: 74
End: 2024-07-10

## 2024-07-10 NOTE — TELEPHONE ENCOUNTER
Patient needs cancel her CLN on 7-16-24.  Patient will called back to rescheduled when she feel better.    Thank you

## 2024-07-11 RX ORDER — ATORVASTATIN CALCIUM 40 MG/1
TABLET, FILM COATED ORAL
Qty: 90 TABLET | Refills: 0 | Status: SHIPPED | OUTPATIENT
Start: 2024-07-11

## 2024-07-16 DIAGNOSIS — E11.9 TYPE 2 DIABETES MELLITUS WITHOUT COMPLICATION, WITHOUT LONG-TERM CURRENT USE OF INSULIN (HCC): ICD-10-CM

## 2024-07-16 RX ORDER — METFORMIN HYDROCHLORIDE 500 MG/1
TABLET, EXTENDED RELEASE ORAL
Qty: 180 TABLET | Refills: 0 | Status: SHIPPED | OUTPATIENT
Start: 2024-07-16

## 2024-07-18 DIAGNOSIS — E11.9 TYPE 2 DIABETES MELLITUS WITHOUT COMPLICATION, WITHOUT LONG-TERM CURRENT USE OF INSULIN (HCC): ICD-10-CM

## 2024-07-18 RX ORDER — METFORMIN HCL 500 MG
TABLET, EXTENDED RELEASE 24 HR ORAL
Qty: 180 TABLET | Refills: 0 | OUTPATIENT
Start: 2024-07-18

## 2024-07-29 DIAGNOSIS — I10 ESSENTIAL HYPERTENSION: ICD-10-CM

## 2024-07-29 DIAGNOSIS — E11.9 TYPE 2 DIABETES MELLITUS WITHOUT COMPLICATION, WITHOUT LONG-TERM CURRENT USE OF INSULIN (HCC): Primary | ICD-10-CM

## 2024-07-29 RX ORDER — DIPHENHYDRAMINE HYDROCHLORIDE 25 MG/1
1 CAPSULE, LIQUID FILLED ORAL DAILY
Qty: 1 KIT | Refills: 0 | Status: SHIPPED | OUTPATIENT
Start: 2024-07-29

## 2024-07-29 RX ORDER — LISINOPRIL 40 MG/1
40 TABLET ORAL DAILY
Qty: 90 TABLET | Refills: 3 | Status: SHIPPED | OUTPATIENT
Start: 2024-07-29

## 2024-07-29 RX ORDER — LANCETS 30 GAUGE
1 EACH MISCELLANEOUS DAILY
Qty: 100 EACH | Refills: 3 | Status: SHIPPED | OUTPATIENT
Start: 2024-07-29

## 2024-07-29 RX ORDER — GLUCOSAMINE HCL/CHONDROITIN SU 500-400 MG
CAPSULE ORAL
Qty: 50 STRIP | Refills: 3 | Status: SHIPPED | OUTPATIENT
Start: 2024-07-29

## 2024-07-29 RX ORDER — LISINOPRIL 40 MG/1
40 TABLET ORAL DAILY
Qty: 90 TABLET | Refills: 0 | OUTPATIENT
Start: 2024-07-29

## 2024-07-29 NOTE — TELEPHONE ENCOUNTER
Marcia called requesting a refill of the below medication which has been pended for you:     Requested Prescriptions     Pending Prescriptions Disp Refills    lisinopril (PRINIVIL;ZESTRIL) 40 MG tablet 90 tablet 3     Sig: Take 1 tablet by mouth daily    Blood Glucose Monitoring Suppl (BLOOD GLUCOSE MONITOR SYSTEM) w/Device KIT 1 kit 0     Si each by Does not apply route daily    blood glucose monitor strips 50 strip 3     Sig: Test once time a day    Lancets MISC 100 each 3     Si each by Does not apply route daily       Last Appointment Date: 4/15/2024  Next Appointment Date: 10/14/2024    Allergies   Allergen Reactions    Corticosteroids Palpitations and Other (See Comments)     Causes elevated blood pressure    Niacin And Related Palpitations and Other (See Comments)     Causes elevated blood pressure

## 2024-09-29 DIAGNOSIS — E11.9 TYPE 2 DIABETES MELLITUS WITHOUT COMPLICATION, WITHOUT LONG-TERM CURRENT USE OF INSULIN (HCC): ICD-10-CM

## 2024-09-30 RX ORDER — METFORMIN HCL 500 MG
TABLET, EXTENDED RELEASE 24 HR ORAL
Qty: 180 TABLET | Refills: 0 | Status: SHIPPED | OUTPATIENT
Start: 2024-09-30 | End: 2024-10-02 | Stop reason: DRUGHIGH

## 2024-09-30 NOTE — TELEPHONE ENCOUNTER
Marcia SONA Ponce called to request a refill on her medication.      Last office visit : 4/15/2024   Next office visit : 10/14/2024     Requested Prescriptions     Pending Prescriptions Disp Refills    metFORMIN (GLUCOPHAGE-XR) 500 MG extended release tablet [Pharmacy Med Name: metFORMIN HCl  MG Oral Tablet Extended Release 24 Hour] 180 tablet 0     Sig: TAKE 1 TABLET BY MOUTH TWICE DAILY WITH MEALS            April DEE Rosenberg LPN

## 2024-10-02 DIAGNOSIS — E11.9 TYPE 2 DIABETES MELLITUS WITHOUT COMPLICATION, WITHOUT LONG-TERM CURRENT USE OF INSULIN (HCC): Primary | ICD-10-CM

## 2024-10-02 DIAGNOSIS — E78.2 MIXED HYPERLIPIDEMIA: Primary | ICD-10-CM

## 2024-10-02 DIAGNOSIS — E11.9 TYPE 2 DIABETES MELLITUS WITHOUT COMPLICATION, WITHOUT LONG-TERM CURRENT USE OF INSULIN (HCC): ICD-10-CM

## 2024-10-02 DIAGNOSIS — I10 ESSENTIAL HYPERTENSION: ICD-10-CM

## 2024-10-02 NOTE — TELEPHONE ENCOUNTER
Marcia Ponce called to request a refill on her medication.  Medication increase per lab result note on 5/22/24.    Last office visit : 4/15/2024   Next office visit : 10/14/2024     Requested Prescriptions     Pending Prescriptions Disp Refills    metFORMIN (GLUCOPHAGE) 1000 MG tablet 180 tablet 1     Sig: Take 1 tablet by mouth 2 times daily (with meals)            Елена Max LPN

## 2024-10-14 ENCOUNTER — OFFICE VISIT (OUTPATIENT)
Dept: PRIMARY CARE CLINIC | Age: 74
End: 2024-10-14
Payer: MEDICARE

## 2024-10-14 VITALS
HEART RATE: 73 BPM | DIASTOLIC BLOOD PRESSURE: 72 MMHG | HEIGHT: 61 IN | TEMPERATURE: 97 F | WEIGHT: 211 LBS | OXYGEN SATURATION: 97 % | SYSTOLIC BLOOD PRESSURE: 118 MMHG | BODY MASS INDEX: 39.84 KG/M2

## 2024-10-14 DIAGNOSIS — I10 ESSENTIAL HYPERTENSION: ICD-10-CM

## 2024-10-14 DIAGNOSIS — Z23 NEED FOR VACCINATION: Primary | ICD-10-CM

## 2024-10-14 DIAGNOSIS — E78.5 HYPERLIPIDEMIA, UNSPECIFIED HYPERLIPIDEMIA TYPE: ICD-10-CM

## 2024-10-14 DIAGNOSIS — E66.01 OBESITY, CLASS III, BMI 40-49.9 (MORBID OBESITY): ICD-10-CM

## 2024-10-14 DIAGNOSIS — E11.9 TYPE 2 DIABETES MELLITUS WITHOUT COMPLICATION, WITHOUT LONG-TERM CURRENT USE OF INSULIN (HCC): ICD-10-CM

## 2024-10-14 PROCEDURE — 90653 IIV ADJUVANT VACCINE IM: CPT | Performed by: NURSE PRACTITIONER

## 2024-10-14 PROCEDURE — 1123F ACP DISCUSS/DSCN MKR DOCD: CPT | Performed by: NURSE PRACTITIONER

## 2024-10-14 PROCEDURE — 3044F HG A1C LEVEL LT 7.0%: CPT | Performed by: NURSE PRACTITIONER

## 2024-10-14 PROCEDURE — 3074F SYST BP LT 130 MM HG: CPT | Performed by: NURSE PRACTITIONER

## 2024-10-14 PROCEDURE — 3078F DIAST BP <80 MM HG: CPT | Performed by: NURSE PRACTITIONER

## 2024-10-14 PROCEDURE — G0008 ADMIN INFLUENZA VIRUS VAC: HCPCS | Performed by: NURSE PRACTITIONER

## 2024-10-14 PROCEDURE — 99214 OFFICE O/P EST MOD 30 MIN: CPT | Performed by: NURSE PRACTITIONER

## 2024-10-14 ASSESSMENT — ENCOUNTER SYMPTOMS
VOMITING: 0
SHORTNESS OF BREATH: 0
PHOTOPHOBIA: 0
BACK PAIN: 0
NAUSEA: 0
VOICE CHANGE: 0
COUGH: 0
COLOR CHANGE: 0
RHINORRHEA: 0

## 2024-10-14 NOTE — PROGRESS NOTES
[facility]  [address]  [address]  [place] [address]  Dept: [phone]  Dept Fax: [phone]  Loc: [phone]    Marcia Ponce is a 74 y.o. female who presents today for her medical conditions/complaints as noted below.  Marcia Ponce is c/o of 6 Month Follow-Up (Pt in office for 6 month follow up - no new issues or concerns)        HPI:     HPI   Chief Complaint   Patient presents with    6 Month Follow-Up     Pt in office for 6 month follow up - no new issues or concerns       Patient presents today for follow-up hypertension, hyperlipidemia, diabetes. Blood pressure is well-controlled. She denies chest pain or shortness of breath. Blood sugar has been in normal range fasting. She is due for labs.   She is followed by cardiology for CAD, valvular disease.     She declines colon cancer screening.    She complains of increased stressors lately with family situations.     Past Medical History:   Diagnosis Date    Arteriosclerotic heart disease (ASHD)     Coronary artery disease involving native coronary artery of native heart without angina pectoris     Family history of heart disease     father  43 MI, brother 52  MI, brother 60  MI, sister 62  MI    Hyperlipidemia     Hypertension     Mild mitral regurgitation 2016    Mild tricuspid regurgitation 2016    Moderate aortic stenosis 2016    s/p AVR, Hx: aortic valve replacement 3/29/16.     Murmur     Type 2 diabetes mellitus without complication (HCC)       Past Surgical History:   Procedure Laterality Date    AORTIC VALVE REPAIR N/A 2016    21-mm [employer] bovine pericardial valve, model No. 3300TFX, serial No. [id number], Dr. Shafer    DILATION AND CURETTAGE OF UTERUS      X 3 miscarriages    EYE SURGERY Bilateral     Right was completed 2023, left was done 2024           10/14/2024    11:10 AM 6/10/2024     2:51 PM 4/15/2024

## 2024-10-30 DIAGNOSIS — E78.5 HYPERLIPIDEMIA, UNSPECIFIED HYPERLIPIDEMIA TYPE: ICD-10-CM

## 2024-10-30 RX ORDER — ATORVASTATIN CALCIUM 40 MG/1
TABLET, FILM COATED ORAL
Qty: 90 TABLET | Refills: 3 | Status: SHIPPED | OUTPATIENT
Start: 2024-10-30

## 2024-11-14 RX ORDER — METOPROLOL SUCCINATE 50 MG/1
TABLET, EXTENDED RELEASE ORAL
Qty: 90 TABLET | Refills: 0 | Status: SHIPPED | OUTPATIENT
Start: 2024-11-14

## 2024-11-14 RX ORDER — HYDROCHLOROTHIAZIDE 12.5 MG/1
CAPSULE ORAL
Qty: 90 CAPSULE | Refills: 0 | Status: SHIPPED | OUTPATIENT
Start: 2024-11-14

## 2024-12-26 RX ORDER — AMLODIPINE BESYLATE 5 MG/1
5 TABLET ORAL NIGHTLY
Qty: 90 TABLET | Refills: 1 | Status: SHIPPED | OUTPATIENT
Start: 2024-12-26

## 2025-02-24 RX ORDER — METOPROLOL SUCCINATE 50 MG/1
TABLET, EXTENDED RELEASE ORAL
Qty: 90 TABLET | Refills: 0 | Status: SHIPPED | OUTPATIENT
Start: 2025-02-24

## 2025-03-03 RX ORDER — HYDROCHLOROTHIAZIDE 12.5 MG/1
CAPSULE ORAL
Qty: 90 CAPSULE | Refills: 0 | OUTPATIENT
Start: 2025-03-03

## 2025-03-03 RX ORDER — METOPROLOL SUCCINATE 50 MG/1
TABLET, EXTENDED RELEASE ORAL
Qty: 90 TABLET | Refills: 0 | OUTPATIENT
Start: 2025-03-03

## 2025-03-03 RX ORDER — HYDROCHLOROTHIAZIDE 12.5 MG/1
12.5 CAPSULE ORAL EVERY MORNING
Qty: 90 CAPSULE | Refills: 0 | Status: SHIPPED | OUTPATIENT
Start: 2025-03-03

## 2025-04-14 ENCOUNTER — OFFICE VISIT (OUTPATIENT)
Dept: PRIMARY CARE CLINIC | Age: 75
End: 2025-04-14
Payer: MEDICARE

## 2025-04-14 VITALS
TEMPERATURE: 97.4 F | HEART RATE: 70 BPM | HEIGHT: 61 IN | WEIGHT: 194.4 LBS | SYSTOLIC BLOOD PRESSURE: 148 MMHG | OXYGEN SATURATION: 98 % | BODY MASS INDEX: 36.7 KG/M2 | DIASTOLIC BLOOD PRESSURE: 80 MMHG

## 2025-04-14 DIAGNOSIS — I10 ESSENTIAL HYPERTENSION: ICD-10-CM

## 2025-04-14 DIAGNOSIS — E78.5 HYPERLIPIDEMIA, UNSPECIFIED HYPERLIPIDEMIA TYPE: ICD-10-CM

## 2025-04-14 DIAGNOSIS — I97.89 POSTOPERATIVE ATRIAL FIBRILLATION (HCC): ICD-10-CM

## 2025-04-14 DIAGNOSIS — E11.9 TYPE 2 DIABETES MELLITUS WITHOUT COMPLICATION, WITHOUT LONG-TERM CURRENT USE OF INSULIN: ICD-10-CM

## 2025-04-14 DIAGNOSIS — Z00.00 MEDICARE ANNUAL WELLNESS VISIT, SUBSEQUENT: Primary | ICD-10-CM

## 2025-04-14 DIAGNOSIS — I48.91 POSTOPERATIVE ATRIAL FIBRILLATION (HCC): ICD-10-CM

## 2025-04-14 DIAGNOSIS — I50.22 CHRONIC SYSTOLIC (CONGESTIVE) HEART FAILURE: ICD-10-CM

## 2025-04-14 PROCEDURE — 3079F DIAST BP 80-89 MM HG: CPT | Performed by: NURSE PRACTITIONER

## 2025-04-14 PROCEDURE — 1159F MED LIST DOCD IN RCRD: CPT | Performed by: NURSE PRACTITIONER

## 2025-04-14 PROCEDURE — 3077F SYST BP >= 140 MM HG: CPT | Performed by: NURSE PRACTITIONER

## 2025-04-14 PROCEDURE — 1123F ACP DISCUSS/DSCN MKR DOCD: CPT | Performed by: NURSE PRACTITIONER

## 2025-04-14 PROCEDURE — G0439 PPPS, SUBSEQ VISIT: HCPCS | Performed by: NURSE PRACTITIONER

## 2025-04-14 SDOH — ECONOMIC STABILITY: FOOD INSECURITY: WITHIN THE PAST 12 MONTHS, YOU WORRIED THAT YOUR FOOD WOULD RUN OUT BEFORE YOU GOT MONEY TO BUY MORE.: SOMETIMES TRUE

## 2025-04-14 SDOH — ECONOMIC STABILITY: FOOD INSECURITY: WITHIN THE PAST 12 MONTHS, THE FOOD YOU BOUGHT JUST DIDN'T LAST AND YOU DIDN'T HAVE MONEY TO GET MORE.: SOMETIMES TRUE

## 2025-04-14 ASSESSMENT — PATIENT HEALTH QUESTIONNAIRE - PHQ9
SUM OF ALL RESPONSES TO PHQ QUESTIONS 1-9: 0
SUM OF ALL RESPONSES TO PHQ QUESTIONS 1-9: 0
1. LITTLE INTEREST OR PLEASURE IN DOING THINGS: NOT AT ALL
SUM OF ALL RESPONSES TO PHQ QUESTIONS 1-9: 0
SUM OF ALL RESPONSES TO PHQ QUESTIONS 1-9: 0
2. FEELING DOWN, DEPRESSED OR HOPELESS: NOT AT ALL

## 2025-04-14 ASSESSMENT — LIFESTYLE VARIABLES
HOW OFTEN DO YOU HAVE A DRINK CONTAINING ALCOHOL: NEVER
HOW MANY STANDARD DRINKS CONTAINING ALCOHOL DO YOU HAVE ON A TYPICAL DAY: PATIENT DOES NOT DRINK

## 2025-04-14 NOTE — PROGRESS NOTES
Medicare Annual Wellness Visit    Marcia Ponce is here for Medicare AWV    Assessment & Plan  1. Annual wellness examination: Stable.  - No new symptoms reported.  - Physical examination reveals clear lungs.  - Blood work is ordered and should be completed within the next week, preferably on the same day as the appointment with Dr. Craft.    Follow-up  - Follow-up appointment scheduled in 6 months.  Medicare annual wellness visit, subsequent  Essential hypertension  -     Lipid Panel; Future  -     Hemoglobin A1C; Future  -     Comprehensive Metabolic Panel; Future  -     TSH; Future  -     CBC with Auto Differential; Future  Chronic systolic (congestive) heart failure  -     Lipid Panel; Future  -     Hemoglobin A1C; Future  -     Comprehensive Metabolic Panel; Future  -     TSH; Future  -     CBC with Auto Differential; Future  Postoperative atrial fibrillation (HCC)  -     Lipid Panel; Future  -     Hemoglobin A1C; Future  -     Comprehensive Metabolic Panel; Future  -     TSH; Future  -     CBC with Auto Differential; Future  Type 2 diabetes mellitus without complication, without long-term current use of insulin  -     Lipid Panel; Future  -     Hemoglobin A1C; Future  -     Comprehensive Metabolic Panel; Future  -     TSH; Future  -     CBC with Auto Differential; Future  Hyperlipidemia, unspecified hyperlipidemia type  -     Lipid Panel; Future  -     Hemoglobin A1C; Future  -     Comprehensive Metabolic Panel; Future  -     TSH; Future  -     CBC with Auto Differential; Future    Results       No follow-ups on file.     Subjective   The following acute and/or chronic problems were also addressed today:  None.   History of Present Illness  The patient presents for an annual wellness visit.    No new health concerns are reported since the last visit. Blood work that was previously ordered has not yet been completed due to logistical challenges related to her residence in [place]. The necessity of

## 2025-04-21 ENCOUNTER — OFFICE VISIT (OUTPATIENT)
Dept: CARDIOLOGY CLINIC | Age: 75
End: 2025-04-21
Payer: MEDICARE

## 2025-04-21 VITALS
DIASTOLIC BLOOD PRESSURE: 88 MMHG | HEART RATE: 70 BPM | SYSTOLIC BLOOD PRESSURE: 156 MMHG | HEIGHT: 62 IN | BODY MASS INDEX: 35.7 KG/M2 | WEIGHT: 194 LBS

## 2025-04-21 DIAGNOSIS — R06.02 SHORTNESS OF BREATH: ICD-10-CM

## 2025-04-21 DIAGNOSIS — I38 VALVULAR HEART DISEASE: ICD-10-CM

## 2025-04-21 DIAGNOSIS — E78.2 MIXED HYPERLIPIDEMIA: ICD-10-CM

## 2025-04-21 DIAGNOSIS — I25.10 CORONARY ARTERY DISEASE INVOLVING NATIVE CORONARY ARTERY OF NATIVE HEART WITHOUT ANGINA PECTORIS: ICD-10-CM

## 2025-04-21 DIAGNOSIS — Z95.2 S/P AVR: ICD-10-CM

## 2025-04-21 DIAGNOSIS — I10 ESSENTIAL HYPERTENSION: ICD-10-CM

## 2025-04-21 DIAGNOSIS — I10 ESSENTIAL HYPERTENSION: Primary | ICD-10-CM

## 2025-04-21 LAB
ANION GAP SERPL CALCULATED.3IONS-SCNC: 11 MMOL/L (ref 8–16)
BNP BLD-MCNC: 417 PG/ML (ref 0–449)
BUN SERPL-MCNC: 27 MG/DL (ref 8–23)
CALCIUM SERPL-MCNC: 9.9 MG/DL (ref 8.8–10.2)
CHLORIDE SERPL-SCNC: 101 MMOL/L (ref 98–107)
CO2 SERPL-SCNC: 28 MMOL/L (ref 22–29)
CREAT SERPL-MCNC: 1.3 MG/DL (ref 0.5–0.9)
GLUCOSE SERPL-MCNC: 128 MG/DL (ref 70–99)
POTASSIUM SERPL-SCNC: 3.8 MMOL/L (ref 3.5–5.1)
SODIUM SERPL-SCNC: 140 MMOL/L (ref 136–145)

## 2025-04-21 PROCEDURE — 3079F DIAST BP 80-89 MM HG: CPT | Performed by: INTERNAL MEDICINE

## 2025-04-21 PROCEDURE — 99214 OFFICE O/P EST MOD 30 MIN: CPT | Performed by: INTERNAL MEDICINE

## 2025-04-21 PROCEDURE — 3077F SYST BP >= 140 MM HG: CPT | Performed by: INTERNAL MEDICINE

## 2025-04-21 PROCEDURE — 1159F MED LIST DOCD IN RCRD: CPT | Performed by: INTERNAL MEDICINE

## 2025-04-21 PROCEDURE — 93000 ELECTROCARDIOGRAM COMPLETE: CPT | Performed by: INTERNAL MEDICINE

## 2025-04-21 PROCEDURE — 1123F ACP DISCUSS/DSCN MKR DOCD: CPT | Performed by: INTERNAL MEDICINE

## 2025-04-21 RX ORDER — CLONIDINE HYDROCHLORIDE 0.1 MG/1
0.1 TABLET ORAL 3 TIMES DAILY PRN
Qty: 60 TABLET | Refills: 1 | Status: SHIPPED | OUTPATIENT
Start: 2025-04-21

## 2025-04-21 ASSESSMENT — ENCOUNTER SYMPTOMS
DIARRHEA: 0
RESPIRATORY NEGATIVE: 1
GASTROINTESTINAL NEGATIVE: 1
SHORTNESS OF BREATH: 0
EYES NEGATIVE: 1
VOMITING: 0
NAUSEA: 0

## 2025-04-21 NOTE — PROGRESS NOTES
[facility] Progress Note                            Date:  4/21/2025  Patient: Marcia Ponce  Age:  75 y.o., 1950      Reason for evaluation:         SUBJECTIVE:    Returns today for follow-up assessment followed for bioprosthetic aortic valve, coronary artery disease, hyperlipidemia, hypertension overall doing well.  Dyspnea about the same denies chest pain.  She wonders if she has congestive heart failure.  Trace edema on exam I could not locate a previous BNP level we will go ahead and obtain one.  No other complaints or issues reported.    Review of Systems   Constitutional: Negative.  Negative for chills, fever and unexpected weight change.   HENT: Negative.     Eyes: Negative.    Respiratory: Negative.  Negative for shortness of breath.    Cardiovascular: Negative.  Negative for chest pain.   Gastrointestinal: Negative.  Negative for diarrhea, nausea and vomiting.   Endocrine: Negative.    Genitourinary: Negative.    Musculoskeletal: Negative.    Skin: Negative.    Neurological: Negative.    All other systems reviewed and are negative.        OBJECTIVE:    BP (!) 156/88   Pulse 70   Ht 1.562 m (5' 1.5\")   Wt 88 kg (194 lb)   BMI 36.06 kg/m²     Labs:   CBC: No results for input(s): \"WBC\", \"HGB\", \"HCT\", \"PLT\" in the last 72 hours.  BMP:No results for input(s): \"NA\", \"K\", \"CO2\", \"BUN\", \"CREATININE\", \"LABGLOM\", \"GLUCOSE\" in the last 72 hours.  BNP: No results for input(s): \"BNP\" in the last 72 hours.  PT/INR: No results for input(s): \"PROTIME\", \"INR\" in the last 72 hours.  APTT:No results for input(s): \"APTT\" in the last 72 hours.  CARDIAC ENZYMES:No results for input(s): \"CKTOTAL\", \"CKMB\", \"CKMBINDEX\", \"TROPONINI\" in the last 72 hours.  FASTING LIPID PANEL:  Lab Results   Component Value Date/Time    HDL 35 05/22/2024 09:41 AM    LDLDIRECT 111 08/10/2015 07:33 AM    TRIG 228 05/22/2024 09:41 AM     LIVER PROFILE:No results for input(s): \"AST\", \"ALT\", \"LABALBU\" in the last 72 hours.

## 2025-05-01 ENCOUNTER — HOSPITAL ENCOUNTER (OUTPATIENT)
Age: 75
Discharge: HOME | End: 2025-05-01
Attending: INTERNAL MEDICINE
Payer: MEDICARE

## 2025-05-01 DIAGNOSIS — R06.02 SHORTNESS OF BREATH: ICD-10-CM

## 2025-05-01 LAB
ECHO AO ASC DIAM: 2 CM
ECHO AO ROOT DIAM: 2 CM
ECHO AO SINUS VALSALVA DIAM: 2.3 CM
ECHO AO ST JNCT DIAM: 2.3 CM
ECHO AV AREA PEAK VELOCITY: 1.3 CM2
ECHO AV AREA VTI: 1.6 CM2
ECHO AV MEAN GRADIENT: 16 MMHG
ECHO AV MEAN VELOCITY: 1.9 M/S
ECHO AV PEAK GRADIENT: 31 MMHG
ECHO AV PEAK VELOCITY: 2.8 M/S
ECHO AV VELOCITY RATIO: 0.46
ECHO AV VTI: 59.5 CM
ECHO EST RA PRESSURE: 3 MMHG
ECHO IVC PROX: 1.5 CM
ECHO LA AREA 2C: 18.4 CM2
ECHO LA AREA 4C: 17.5 CM2
ECHO LA DIAMETER: 3.4 CM
ECHO LA MAJOR AXIS: 5.1 CM
ECHO LA MINOR AXIS: 5.1 CM
ECHO LA TO AORTIC ROOT RATIO: 1.7
ECHO LA VOL BP: 52 ML (ref 22–52)
ECHO LA VOL MOD A2C: 55 ML (ref 22–52)
ECHO LA VOL MOD A4C: 50 ML (ref 22–52)
ECHO LV E' LATERAL VELOCITY: 10.6 CM/S
ECHO LV E' SEPTAL VELOCITY: 8.16 CM/S
ECHO LV EDV A2C: 119 ML
ECHO LV EDV A4C: 123 ML
ECHO LV EF PHYSICIAN: 60 %
ECHO LV EJECTION FRACTION A2C: 62 %
ECHO LV EJECTION FRACTION A4C: 64 %
ECHO LV EJECTION FRACTION BIPLANE: 64 % (ref 55–100)
ECHO LV ESV A2C: 45 ML
ECHO LV ESV A4C: 44 ML
ECHO LV FRACTIONAL SHORTENING: 25 % (ref 28–44)
ECHO LV INTERNAL DIMENSION DIASTOLIC: 5.1 CM (ref 3.9–5.3)
ECHO LV INTERNAL DIMENSION SYSTOLIC: 3.8 CM
ECHO LV IVSD: 0.7 CM (ref 0.6–0.9)
ECHO LV MASS 2D: 118.7 G (ref 67–162)
ECHO LV POSTERIOR WALL DIASTOLIC: 0.7 CM (ref 0.6–0.9)
ECHO LV RELATIVE WALL THICKNESS RATIO: 0.27
ECHO LVOT AREA: 2.8 CM2
ECHO LVOT AV VTI INDEX: 0.55
ECHO LVOT DIAM: 1.9 CM
ECHO LVOT MEAN GRADIENT: 4 MMHG
ECHO LVOT PEAK GRADIENT: 6 MMHG
ECHO LVOT PEAK VELOCITY: 1.3 M/S
ECHO LVOT SV: 92.1 ML
ECHO LVOT VTI: 32.5 CM
ECHO MV A VELOCITY: 1.05 M/S
ECHO MV AREA VTI: 2.2 CM2
ECHO MV E DECELERATION TIME (DT): 278 MS
ECHO MV E VELOCITY: 0.8 M/S
ECHO MV E/A RATIO: 0.76
ECHO MV E/E' LATERAL: 7.55
ECHO MV E/E' RATIO (AVERAGED): 8.68
ECHO MV E/E' SEPTAL: 9.8
ECHO MV LVOT VTI INDEX: 1.28
ECHO MV MAX VELOCITY: 1 M/S
ECHO MV MEAN GRADIENT: 2 MMHG
ECHO MV MEAN VELOCITY: 0.6 M/S
ECHO MV PEAK GRADIENT: 4 MMHG
ECHO MV VTI: 41.5 CM
ECHO PULMONARY ARTERY END DIASTOLIC PRESSURE: 6 MMHG
ECHO PV REGURGITANT MAX VELOCITY: 1.2 M/S
ECHO RA AREA 4C: 12.4 CM2
ECHO RA VOLUME: 26 ML
ECHO RIGHT VENTRICULAR SYSTOLIC PRESSURE (RVSP): 30 MMHG
ECHO RV BASAL DIMENSION: 3.7 CM
ECHO RV INTERNAL DIMENSION: 2.6 CM
ECHO RV LONGITUDINAL DIMENSION: 6.5 CM
ECHO RV MID DIMENSION: 3.8 CM
ECHO RV TAPSE: 1.8 CM (ref 1.7–?)
ECHO TV REGURGITANT MAX VELOCITY: 2.59 M/S
ECHO TV REGURGITANT PEAK GRADIENT: 27 MMHG

## 2025-05-01 PROCEDURE — 6360000004 HC RX CONTRAST MEDICATION: Performed by: INTERNAL MEDICINE

## 2025-05-01 PROCEDURE — C8929 TTE W OR WO FOL WCON,DOPPLER: HCPCS

## 2025-05-01 RX ADMIN — SULFUR HEXAFLUORIDE 2 ML: 60.7; .19; .19 INJECTION, POWDER, LYOPHILIZED, FOR SUSPENSION INTRAVENOUS; INTRAVESICAL at 11:52

## 2025-05-08 DIAGNOSIS — I10 ESSENTIAL HYPERTENSION: ICD-10-CM

## 2025-05-08 DIAGNOSIS — I48.91 POSTOPERATIVE ATRIAL FIBRILLATION (HCC): ICD-10-CM

## 2025-05-08 DIAGNOSIS — E11.9 TYPE 2 DIABETES MELLITUS WITHOUT COMPLICATION, WITHOUT LONG-TERM CURRENT USE OF INSULIN (HCC): ICD-10-CM

## 2025-05-08 DIAGNOSIS — E78.5 HYPERLIPIDEMIA, UNSPECIFIED HYPERLIPIDEMIA TYPE: ICD-10-CM

## 2025-05-08 DIAGNOSIS — I97.89 POSTOPERATIVE ATRIAL FIBRILLATION (HCC): ICD-10-CM

## 2025-05-08 DIAGNOSIS — I50.22 CHRONIC SYSTOLIC (CONGESTIVE) HEART FAILURE (HCC): ICD-10-CM

## 2025-05-08 LAB
ALBUMIN SERPL-MCNC: 4.2 G/DL (ref 3.5–5.2)
ALP SERPL-CCNC: 88 U/L (ref 35–104)
ALT SERPL-CCNC: 25 U/L (ref 10–35)
ANION GAP SERPL CALCULATED.3IONS-SCNC: 13 MMOL/L (ref 8–16)
AST SERPL-CCNC: 28 U/L (ref 10–35)
BASOPHILS # BLD: 0.1 K/UL (ref 0–0.2)
BASOPHILS NFR BLD: 1.1 % (ref 0–1)
BILIRUB SERPL-MCNC: 0.5 MG/DL (ref 0.2–1.2)
BUN SERPL-MCNC: 28 MG/DL (ref 8–23)
CALCIUM SERPL-MCNC: 10.3 MG/DL (ref 8.8–10.2)
CHLORIDE SERPL-SCNC: 101 MMOL/L (ref 98–107)
CHOLEST SERPL-MCNC: 168 MG/DL (ref 0–199)
CO2 SERPL-SCNC: 29 MMOL/L (ref 22–29)
CREAT SERPL-MCNC: 1.3 MG/DL (ref 0.5–0.9)
EOSINOPHIL # BLD: 0.8 K/UL (ref 0–0.6)
EOSINOPHIL NFR BLD: 10.5 % (ref 0–5)
ERYTHROCYTE [DISTWIDTH] IN BLOOD BY AUTOMATED COUNT: 13.3 % (ref 11.5–14.5)
GLUCOSE SERPL-MCNC: 97 MG/DL (ref 70–99)
HBA1C MFR BLD: 6.4 % (ref 4–5.6)
HCT VFR BLD AUTO: 36.1 % (ref 37–47)
HDLC SERPL-MCNC: 36 MG/DL (ref 40–60)
HGB BLD-MCNC: 12 G/DL (ref 12–16)
IMM GRANULOCYTES # BLD: 0 K/UL
LDLC SERPL CALC-MCNC: 85 MG/DL
LYMPHOCYTES # BLD: 1.4 K/UL (ref 1.1–4.5)
LYMPHOCYTES NFR BLD: 18.9 % (ref 20–40)
MCH RBC QN AUTO: 28.7 PG (ref 27–31)
MCHC RBC AUTO-ENTMCNC: 33.2 G/DL (ref 33–37)
MCV RBC AUTO: 86.4 FL (ref 81–99)
MONOCYTES # BLD: 0.6 K/UL (ref 0–0.9)
MONOCYTES NFR BLD: 8.1 % (ref 0–10)
NEUTROPHILS # BLD: 4.5 K/UL (ref 1.5–7.5)
NEUTS SEG NFR BLD: 61.1 % (ref 50–65)
PLATELET # BLD AUTO: 271 K/UL (ref 130–400)
PMV BLD AUTO: 8.9 FL (ref 9.4–12.3)
POTASSIUM SERPL-SCNC: 4 MMOL/L (ref 3.5–5.1)
PROT SERPL-MCNC: 7.4 G/DL (ref 6.4–8.3)
RBC # BLD AUTO: 4.18 M/UL (ref 4.2–5.4)
SODIUM SERPL-SCNC: 143 MMOL/L (ref 136–145)
TRIGL SERPL-MCNC: 237 MG/DL (ref 0–149)
TSH SERPL DL<=0.005 MIU/L-ACNC: 3.61 UIU/ML (ref 0.27–4.2)
WBC # BLD AUTO: 7.4 K/UL (ref 4.8–10.8)

## 2025-05-13 ENCOUNTER — RESULTS FOLLOW-UP (OUTPATIENT)
Dept: PRIMARY CARE CLINIC | Age: 75
End: 2025-05-13

## 2025-05-29 RX ORDER — METOPROLOL SUCCINATE 50 MG/1
50 TABLET, EXTENDED RELEASE ORAL DAILY
Qty: 90 TABLET | Refills: 3 | Status: SHIPPED | OUTPATIENT
Start: 2025-05-29

## 2025-06-04 DIAGNOSIS — E11.9 TYPE 2 DIABETES MELLITUS WITHOUT COMPLICATION, WITHOUT LONG-TERM CURRENT USE OF INSULIN (HCC): ICD-10-CM

## 2025-06-04 NOTE — TELEPHONE ENCOUNTER
Marcia Ponce called to request a refill on her medication.      Last office visit : 4/14/2025   Next office visit : 10/14/2025     Requested Prescriptions     Signed Prescriptions Disp Refills    metFORMIN (GLUCOPHAGE) 1000 MG tablet 180 tablet 1     Sig: TAKE 1 TABLET BY MOUTH TWICE DAILY WITH MEALS     Authorizing Provider: CHECO ROMERO     Ordering User: EMMY ZAIDI LPN

## 2025-06-09 RX ORDER — HYDROCHLOROTHIAZIDE 12.5 MG/1
CAPSULE ORAL
Qty: 90 CAPSULE | Refills: 1 | Status: SHIPPED | OUTPATIENT
Start: 2025-06-09

## 2025-07-02 RX ORDER — AMLODIPINE BESYLATE 5 MG/1
5 TABLET ORAL NIGHTLY
Qty: 90 TABLET | Refills: 0 | OUTPATIENT
Start: 2025-07-02

## 2025-07-02 RX ORDER — AMLODIPINE BESYLATE 5 MG/1
5 TABLET ORAL NIGHTLY
Qty: 90 TABLET | Refills: 3 | Status: SHIPPED | OUTPATIENT
Start: 2025-07-02

## 2025-07-10 ENCOUNTER — TELEPHONE (OUTPATIENT)
Dept: PHARMACY | Facility: CLINIC | Age: 75
End: 2025-07-10

## 2025-07-10 NOTE — TELEPHONE ENCOUNTER
Component Value Date    LDL 85 05/08/2025    LDLDIRECT 111 08/10/2015       Lab Results   Component Value Date/Time    CHOL 168 05/08/2025 09:30 AM    TRIG 237 05/08/2025 09:30 AM    HDL 36 05/08/2025 09:30 AM    LDLDIRECT 111 08/10/2015 07:33 AM    LDL 85 05/08/2025 09:30 AM    LDL 81 10/24/2023 10:06 AM    ALT 25 05/08/2025 09:30 AM    AST 28 05/08/2025 09:30 AM         Tobacco Use      Smoking status: Never      Smokeless tobacco: Never          PLAN  The following are interventions that have been identified:   Patient noted to have LIS (low income subsidy), but plan does not list the level of LIS patient is eligible for.  Patient OVERDUE refilling Metformin and active on home medication list per payer report; however, appears Rx processed now on 7/2/2025.       Called [facility] to confirm Metformin Rx last fill date of 7/2/2025 and billed to [employer] insurance.  Confirmed prescription is ready for .  However, Lisinopril (and other meds) was picked up on 7/2/25.      Called patient to clarify if issues with medication.  Patient reports her granddaughter picked up the Metformin for her last week.  Patient confirmed she has 3 bottles of Metformin 1000mg (#180 total) with dispense date of 7/2/2025 on bottles.   Patient advocates adherence and states reason not picked up in June was due to transporation (lives 25 miles from grocery stores/pharmacy).  States she plans to 1) start calling meds in 1 week in advance so will be ready when grocery shopping and 2) utilizing their home delivery option. Notified pt of [facility] mailorder options as alternative.  Discussed w/ pt that most pharmacies will hold a prescription for 10-14 days before returning to stock.      Called [facility] to notify - they will confirm again w/ patient and close out sell to ensure claim is not reversed.      Last Visit: 4/14/2025 (PCP: Gabriele)   Next Visit: 10/14/2025 (PCP: Gabriele)   [facility] Attributed Provider Listed: Annalisa GAINES